# Patient Record
Sex: FEMALE | Race: WHITE | NOT HISPANIC OR LATINO | URBAN - METROPOLITAN AREA
[De-identification: names, ages, dates, MRNs, and addresses within clinical notes are randomized per-mention and may not be internally consistent; named-entity substitution may affect disease eponyms.]

---

## 2018-05-31 ENCOUNTER — EMERGENCY (EMERGENCY)
Facility: HOSPITAL | Age: 63
LOS: 1 days | Discharge: HOME | End: 2018-05-31
Attending: EMERGENCY MEDICINE
Payer: COMMERCIAL

## 2018-05-31 VITALS
RESPIRATION RATE: 16 BRPM | TEMPERATURE: 98 F | WEIGHT: 169.98 LBS | HEART RATE: 74 BPM | SYSTOLIC BLOOD PRESSURE: 119 MMHG | OXYGEN SATURATION: 100 % | DIASTOLIC BLOOD PRESSURE: 75 MMHG

## 2018-05-31 VITALS
OXYGEN SATURATION: 100 % | HEART RATE: 65 BPM | SYSTOLIC BLOOD PRESSURE: 107 MMHG | RESPIRATION RATE: 16 BRPM | TEMPERATURE: 98 F

## 2018-05-31 DIAGNOSIS — Z90.5 ACQUIRED ABSENCE OF KIDNEY: Chronic | ICD-10-CM

## 2018-05-31 LAB
ALBUMIN SERPL ELPH-MCNC: 3.8 G/DL — SIGNIFICANT CHANGE UP (ref 3.3–5)
ALP SERPL-CCNC: 89 U/L — SIGNIFICANT CHANGE UP (ref 40–120)
ALT FLD-CCNC: 35 U/L — SIGNIFICANT CHANGE UP (ref 12–78)
ANION GAP SERPL CALC-SCNC: 8 MMOL/L — SIGNIFICANT CHANGE UP (ref 5–17)
AST SERPL-CCNC: 22 U/L — SIGNIFICANT CHANGE UP (ref 15–37)
BASOPHILS # BLD AUTO: 0.03 K/UL — SIGNIFICANT CHANGE UP (ref 0–0.2)
BASOPHILS NFR BLD AUTO: 0.5 % — SIGNIFICANT CHANGE UP (ref 0–2)
BILIRUB SERPL-MCNC: 0.5 MG/DL — SIGNIFICANT CHANGE UP (ref 0.2–1.2)
BUN SERPL-MCNC: 22 MG/DL — SIGNIFICANT CHANGE UP (ref 7–23)
CALCIUM SERPL-MCNC: 8.9 MG/DL — SIGNIFICANT CHANGE UP (ref 8.5–10.1)
CHLORIDE SERPL-SCNC: 107 MMOL/L — SIGNIFICANT CHANGE UP (ref 96–108)
CO2 SERPL-SCNC: 25 MMOL/L — SIGNIFICANT CHANGE UP (ref 22–31)
CREAT SERPL-MCNC: 1.1 MG/DL — SIGNIFICANT CHANGE UP (ref 0.5–1.3)
EOSINOPHIL # BLD AUTO: 0.1 K/UL — SIGNIFICANT CHANGE UP (ref 0–0.5)
EOSINOPHIL NFR BLD AUTO: 1.8 % — SIGNIFICANT CHANGE UP (ref 0–6)
GLUCOSE SERPL-MCNC: 104 MG/DL — HIGH (ref 70–99)
HCT VFR BLD CALC: 37.6 % — SIGNIFICANT CHANGE UP (ref 34.5–45)
HGB BLD-MCNC: 12.9 G/DL — SIGNIFICANT CHANGE UP (ref 11.5–15.5)
IMM GRANULOCYTES NFR BLD AUTO: 0.2 % — SIGNIFICANT CHANGE UP (ref 0–1.5)
LYMPHOCYTES # BLD AUTO: 2.6 K/UL — SIGNIFICANT CHANGE UP (ref 1–3.3)
LYMPHOCYTES # BLD AUTO: 46.5 % — HIGH (ref 13–44)
MCHC RBC-ENTMCNC: 31.2 PG — SIGNIFICANT CHANGE UP (ref 27–34)
MCHC RBC-ENTMCNC: 34.3 GM/DL — SIGNIFICANT CHANGE UP (ref 32–36)
MCV RBC AUTO: 90.8 FL — SIGNIFICANT CHANGE UP (ref 80–100)
MONOCYTES # BLD AUTO: 0.47 K/UL — SIGNIFICANT CHANGE UP (ref 0–0.9)
MONOCYTES NFR BLD AUTO: 8.4 % — SIGNIFICANT CHANGE UP (ref 2–14)
NEUTROPHILS # BLD AUTO: 2.38 K/UL — SIGNIFICANT CHANGE UP (ref 1.8–7.4)
NEUTROPHILS NFR BLD AUTO: 42.6 % — LOW (ref 43–77)
NRBC # BLD: 0 /100 WBCS — SIGNIFICANT CHANGE UP (ref 0–0)
PLATELET # BLD AUTO: 115 K/UL — LOW (ref 150–400)
POTASSIUM SERPL-MCNC: 4.1 MMOL/L — SIGNIFICANT CHANGE UP (ref 3.5–5.3)
POTASSIUM SERPL-SCNC: 4.1 MMOL/L — SIGNIFICANT CHANGE UP (ref 3.5–5.3)
PROT SERPL-MCNC: 7.2 G/DL — SIGNIFICANT CHANGE UP (ref 6–8.3)
RBC # BLD: 4.14 M/UL — SIGNIFICANT CHANGE UP (ref 3.8–5.2)
RBC # FLD: 12.8 % — SIGNIFICANT CHANGE UP (ref 10.3–14.5)
SODIUM SERPL-SCNC: 140 MMOL/L — SIGNIFICANT CHANGE UP (ref 135–145)
TROPONIN I SERPL-MCNC: <.015 NG/ML — SIGNIFICANT CHANGE UP (ref 0.01–0.04)
WBC # BLD: 5.59 K/UL — SIGNIFICANT CHANGE UP (ref 3.8–10.5)
WBC # FLD AUTO: 5.59 K/UL — SIGNIFICANT CHANGE UP (ref 3.8–10.5)

## 2018-05-31 PROCEDURE — 85027 COMPLETE CBC AUTOMATED: CPT

## 2018-05-31 PROCEDURE — 96374 THER/PROPH/DIAG INJ IV PUSH: CPT

## 2018-05-31 PROCEDURE — 70450 CT HEAD/BRAIN W/O DYE: CPT

## 2018-05-31 PROCEDURE — 80053 COMPREHEN METABOLIC PANEL: CPT

## 2018-05-31 PROCEDURE — 84484 ASSAY OF TROPONIN QUANT: CPT

## 2018-05-31 PROCEDURE — 99284 EMERGENCY DEPT VISIT MOD MDM: CPT | Mod: 25

## 2018-05-31 PROCEDURE — 96375 TX/PRO/DX INJ NEW DRUG ADDON: CPT

## 2018-05-31 PROCEDURE — 99285 EMERGENCY DEPT VISIT HI MDM: CPT | Mod: 25

## 2018-05-31 PROCEDURE — 70450 CT HEAD/BRAIN W/O DYE: CPT | Mod: 26

## 2018-05-31 PROCEDURE — 93010 ELECTROCARDIOGRAM REPORT: CPT

## 2018-05-31 PROCEDURE — 93005 ELECTROCARDIOGRAM TRACING: CPT

## 2018-05-31 RX ORDER — SODIUM CHLORIDE 9 MG/ML
3 INJECTION INTRAMUSCULAR; INTRAVENOUS; SUBCUTANEOUS ONCE
Qty: 0 | Refills: 0 | Status: COMPLETED | OUTPATIENT
Start: 2018-05-31 | End: 2018-05-31

## 2018-05-31 RX ORDER — METOCLOPRAMIDE HCL 10 MG
10 TABLET ORAL ONCE
Qty: 0 | Refills: 0 | Status: COMPLETED | OUTPATIENT
Start: 2018-05-31 | End: 2018-05-31

## 2018-05-31 RX ADMIN — Medication 125 MILLIGRAM(S): at 04:31

## 2018-05-31 RX ADMIN — Medication 10 MILLIGRAM(S): at 04:32

## 2018-05-31 RX ADMIN — SODIUM CHLORIDE 3 MILLILITER(S): 9 INJECTION INTRAMUSCULAR; INTRAVENOUS; SUBCUTANEOUS at 03:34

## 2018-05-31 NOTE — ED PROVIDER NOTE - OBJECTIVE STATEMENT
64yo female who presenst with palpitations paresthesia and "fogginess' on and off for several weeks. pt states she has not been feeling herself, with paresthesias to body, headaches, feeling foggy, no chest pain, vomiting, pt was seen by her pmd and had blood work done and only showed high cholesterol.

## 2018-05-31 NOTE — ED PROVIDER NOTE - CPE EDP NEURO NORM
..Left message on machine to call.     
..Patient notified of mammogram results,   Diagnostic mammogram is scheduled on 1/12/2018.  
normal...

## 2018-05-31 NOTE — ED ADULT NURSE REASSESSMENT NOTE - NS ED NURSE REASSESS COMMENT FT1
0358 Pt. to and from CT in no distress. Normal neuro. Does not want meds as ordered at this time. Doctor aware. No SI/HI/hallucinations. Significant other at bedside. IVF KVO. Will monitor. Pending results/dispo - kris rn

## 2018-05-31 NOTE — ED ADULT NURSE NOTE - OBJECTIVE STATEMENT
Pt. presents to ED after waking up with fuzzy feeling in head, tingling in all extremities, took anxiety pill with some relief. Normal neuro. No drift, no facial droop. A + O x 4.

## 2018-05-31 NOTE — ED PROVIDER NOTE - PROGRESS NOTE DETAILS
pt reevaluated, feeling better, symptoms seem to have improved with meds, results reviewed with pt pt to be d/c home follow up with neuro and return if any symtpoms wrosen

## 2018-05-31 NOTE — ED PROVIDER NOTE - CARE PLAN
Principal Discharge DX:	Paresthesias Principal Discharge DX:	Paresthesias  Secondary Diagnosis:	Migraine

## 2018-05-31 NOTE — ED ADULT TRIAGE NOTE - CHIEF COMPLAINT QUOTE
Pt with palpitations and tingling to extremities - hasn't been feeling well for 2 weeks feels "wobbly and fuzzy" also c/o abd pain

## 2018-10-29 ENCOUNTER — EMERGENCY (EMERGENCY)
Facility: HOSPITAL | Age: 63
LOS: 1 days | Discharge: HOME | End: 2018-10-29
Attending: EMERGENCY MEDICINE
Payer: COMMERCIAL

## 2018-10-29 VITALS
HEIGHT: 61 IN | HEART RATE: 64 BPM | DIASTOLIC BLOOD PRESSURE: 54 MMHG | RESPIRATION RATE: 14 BRPM | WEIGHT: 164.91 LBS | SYSTOLIC BLOOD PRESSURE: 122 MMHG | TEMPERATURE: 98 F | OXYGEN SATURATION: 100 %

## 2018-10-29 VITALS
OXYGEN SATURATION: 100 % | SYSTOLIC BLOOD PRESSURE: 118 MMHG | TEMPERATURE: 98 F | RESPIRATION RATE: 16 BRPM | DIASTOLIC BLOOD PRESSURE: 60 MMHG | HEART RATE: 70 BPM

## 2018-10-29 DIAGNOSIS — Z90.5 ACQUIRED ABSENCE OF KIDNEY: Chronic | ICD-10-CM

## 2018-10-29 LAB
ALBUMIN SERPL ELPH-MCNC: 3.5 G/DL — SIGNIFICANT CHANGE UP (ref 3.3–5)
ALP SERPL-CCNC: 87 U/L — SIGNIFICANT CHANGE UP (ref 40–120)
ALT FLD-CCNC: 39 U/L — SIGNIFICANT CHANGE UP (ref 12–78)
ANION GAP SERPL CALC-SCNC: 10 MMOL/L — SIGNIFICANT CHANGE UP (ref 5–17)
APTT BLD: 31.8 SEC — SIGNIFICANT CHANGE UP (ref 27.5–37.4)
AST SERPL-CCNC: 25 U/L — SIGNIFICANT CHANGE UP (ref 15–37)
BASOPHILS # BLD AUTO: 0.04 K/UL — SIGNIFICANT CHANGE UP (ref 0–0.2)
BASOPHILS NFR BLD AUTO: 0.7 % — SIGNIFICANT CHANGE UP (ref 0–2)
BILIRUB SERPL-MCNC: 0.4 MG/DL — SIGNIFICANT CHANGE UP (ref 0.2–1.2)
BUN SERPL-MCNC: 17 MG/DL — SIGNIFICANT CHANGE UP (ref 7–23)
CALCIUM SERPL-MCNC: 8.5 MG/DL — SIGNIFICANT CHANGE UP (ref 8.5–10.1)
CHLORIDE SERPL-SCNC: 107 MMOL/L — SIGNIFICANT CHANGE UP (ref 96–108)
CO2 SERPL-SCNC: 22 MMOL/L — SIGNIFICANT CHANGE UP (ref 22–31)
CREAT SERPL-MCNC: 1.4 MG/DL — HIGH (ref 0.5–1.3)
EOSINOPHIL # BLD AUTO: 0.1 K/UL — SIGNIFICANT CHANGE UP (ref 0–0.5)
EOSINOPHIL NFR BLD AUTO: 1.7 % — SIGNIFICANT CHANGE UP (ref 0–6)
GLUCOSE SERPL-MCNC: 87 MG/DL — SIGNIFICANT CHANGE UP (ref 70–99)
HCT VFR BLD CALC: 39.8 % — SIGNIFICANT CHANGE UP (ref 34.5–45)
HGB BLD-MCNC: 13.6 G/DL — SIGNIFICANT CHANGE UP (ref 11.5–15.5)
IMM GRANULOCYTES NFR BLD AUTO: 0.2 % — SIGNIFICANT CHANGE UP (ref 0–1.5)
INR BLD: 0.99 RATIO — SIGNIFICANT CHANGE UP (ref 0.88–1.16)
LACTATE SERPL-SCNC: 0.7 MMOL/L — SIGNIFICANT CHANGE UP (ref 0.7–2)
LYMPHOCYTES # BLD AUTO: 2.11 K/UL — SIGNIFICANT CHANGE UP (ref 1–3.3)
LYMPHOCYTES # BLD AUTO: 35.6 % — SIGNIFICANT CHANGE UP (ref 13–44)
MCHC RBC-ENTMCNC: 31.2 PG — SIGNIFICANT CHANGE UP (ref 27–34)
MCHC RBC-ENTMCNC: 34.2 GM/DL — SIGNIFICANT CHANGE UP (ref 32–36)
MCV RBC AUTO: 91.3 FL — SIGNIFICANT CHANGE UP (ref 80–100)
MONOCYTES # BLD AUTO: 0.63 K/UL — SIGNIFICANT CHANGE UP (ref 0–0.9)
MONOCYTES NFR BLD AUTO: 10.6 % — SIGNIFICANT CHANGE UP (ref 2–14)
NEUTROPHILS # BLD AUTO: 3.04 K/UL — SIGNIFICANT CHANGE UP (ref 1.8–7.4)
NEUTROPHILS NFR BLD AUTO: 51.2 % — SIGNIFICANT CHANGE UP (ref 43–77)
NRBC # BLD: 0 /100 WBCS — SIGNIFICANT CHANGE UP (ref 0–0)
PLATELET # BLD AUTO: 148 K/UL — LOW (ref 150–400)
POTASSIUM SERPL-MCNC: 4.1 MMOL/L — SIGNIFICANT CHANGE UP (ref 3.5–5.3)
POTASSIUM SERPL-SCNC: 4.1 MMOL/L — SIGNIFICANT CHANGE UP (ref 3.5–5.3)
PROT SERPL-MCNC: 7.1 G/DL — SIGNIFICANT CHANGE UP (ref 6–8.3)
PROTHROM AB SERPL-ACNC: 10.8 SEC — SIGNIFICANT CHANGE UP (ref 9.8–12.7)
RBC # BLD: 4.36 M/UL — SIGNIFICANT CHANGE UP (ref 3.8–5.2)
RBC # FLD: 13.1 % — SIGNIFICANT CHANGE UP (ref 10.3–14.5)
SODIUM SERPL-SCNC: 139 MMOL/L — SIGNIFICANT CHANGE UP (ref 135–145)
WBC # BLD: 5.93 K/UL — SIGNIFICANT CHANGE UP (ref 3.8–10.5)
WBC # FLD AUTO: 5.93 K/UL — SIGNIFICANT CHANGE UP (ref 3.8–10.5)

## 2018-10-29 PROCEDURE — 80053 COMPREHEN METABOLIC PANEL: CPT

## 2018-10-29 PROCEDURE — 83605 ASSAY OF LACTIC ACID: CPT

## 2018-10-29 PROCEDURE — 96365 THER/PROPH/DIAG IV INF INIT: CPT | Mod: XU

## 2018-10-29 PROCEDURE — 70481 CT ORBIT/EAR/FOSSA W/DYE: CPT | Mod: 26

## 2018-10-29 PROCEDURE — 36415 COLL VENOUS BLD VENIPUNCTURE: CPT

## 2018-10-29 PROCEDURE — 85027 COMPLETE CBC AUTOMATED: CPT

## 2018-10-29 PROCEDURE — 85610 PROTHROMBIN TIME: CPT

## 2018-10-29 PROCEDURE — 87040 BLOOD CULTURE FOR BACTERIA: CPT

## 2018-10-29 PROCEDURE — 85730 THROMBOPLASTIN TIME PARTIAL: CPT

## 2018-10-29 PROCEDURE — 70481 CT ORBIT/EAR/FOSSA W/DYE: CPT

## 2018-10-29 PROCEDURE — 99284 EMERGENCY DEPT VISIT MOD MDM: CPT

## 2018-10-29 PROCEDURE — 99284 EMERGENCY DEPT VISIT MOD MDM: CPT | Mod: 25

## 2018-10-29 RX ORDER — AMPICILLIN SODIUM AND SULBACTAM SODIUM 250; 125 MG/ML; MG/ML
3 INJECTION, POWDER, FOR SUSPENSION INTRAMUSCULAR; INTRAVENOUS ONCE
Qty: 0 | Refills: 0 | Status: COMPLETED | OUTPATIENT
Start: 2018-10-29 | End: 2018-10-29

## 2018-10-29 RX ORDER — FLUORESCEIN SODIUM 9 MG
1 STRIP OPHTHALMIC (EYE) ONCE
Qty: 0 | Refills: 0 | Status: COMPLETED | OUTPATIENT
Start: 2018-10-29 | End: 2018-10-29

## 2018-10-29 RX ADMIN — AMPICILLIN SODIUM AND SULBACTAM SODIUM 3 GRAM(S): 250; 125 INJECTION, POWDER, FOR SUSPENSION INTRAMUSCULAR; INTRAVENOUS at 20:05

## 2018-10-29 RX ADMIN — Medication 1 APPLICATION(S): at 19:15

## 2018-10-29 RX ADMIN — AMPICILLIN SODIUM AND SULBACTAM SODIUM 200 GRAM(S): 250; 125 INJECTION, POWDER, FOR SUSPENSION INTRAMUSCULAR; INTRAVENOUS at 19:32

## 2018-10-29 RX ADMIN — Medication 1 DROP(S): at 19:16

## 2018-10-29 NOTE — ED PROVIDER NOTE - OBJECTIVE STATEMENT
pt c/o rash to right side face x 4 days. no fevers, chills, ha, d/n/v, eye pain or visual changes, weakness or numbness. pt relates itching to site, minimal pain, began as pimple , now erythema spread. pt on bactrim x 3 days without improvement  pmsameer - katlyn

## 2018-10-29 NOTE — ED ADULT NURSE NOTE - OBJECTIVE STATEMENT
received pt in bed #6b Pt A&O c/o right eye redness, swelling & itching since friday States was seen @ Dr potts & given bactrim "but it is not getting better" Pt seen by Dr Mckinney

## 2018-10-29 NOTE — ED PROVIDER NOTE - SKIN, MLM
Skin normal color for race, warm, dry. erythema and mild tenderness to right side nose and under right eye. no vesicles Skin normal color for race, warm, dry. erythema and mild tenderness to right side medial aspect nose and under right eye. no vesicles. no lesions on ear or tip of nose

## 2018-11-03 LAB
CULTURE RESULTS: SIGNIFICANT CHANGE UP
CULTURE RESULTS: SIGNIFICANT CHANGE UP
SPECIMEN SOURCE: SIGNIFICANT CHANGE UP
SPECIMEN SOURCE: SIGNIFICANT CHANGE UP

## 2020-08-14 ENCOUNTER — RESULT REVIEW (OUTPATIENT)
Age: 65
End: 2020-08-14

## 2020-10-13 ENCOUNTER — OUTPATIENT (OUTPATIENT)
Dept: OUTPATIENT SERVICES | Facility: HOSPITAL | Age: 65
LOS: 1 days | End: 2020-10-13

## 2020-10-13 ENCOUNTER — RESULT REVIEW (OUTPATIENT)
Age: 65
End: 2020-10-13

## 2020-10-13 DIAGNOSIS — Z90.5 ACQUIRED ABSENCE OF KIDNEY: Chronic | ICD-10-CM

## 2020-10-13 DIAGNOSIS — C50.919 MALIGNANT NEOPLASM OF UNSPECIFIED SITE OF UNSPECIFIED FEMALE BREAST: ICD-10-CM

## 2020-10-14 LAB — SURGICAL PATHOLOGY STUDY: SIGNIFICANT CHANGE UP

## 2020-11-03 ENCOUNTER — APPOINTMENT (OUTPATIENT)
Dept: ULTRASOUND IMAGING | Facility: IMAGING CENTER | Age: 65
End: 2020-11-03
Payer: COMMERCIAL

## 2020-11-03 ENCOUNTER — OUTPATIENT (OUTPATIENT)
Dept: OUTPATIENT SERVICES | Facility: HOSPITAL | Age: 65
LOS: 1 days | End: 2020-11-03
Payer: COMMERCIAL

## 2020-11-03 VITALS
DIASTOLIC BLOOD PRESSURE: 70 MMHG | WEIGHT: 149.91 LBS | HEIGHT: 61 IN | RESPIRATION RATE: 16 BRPM | TEMPERATURE: 98 F | HEART RATE: 72 BPM | OXYGEN SATURATION: 99 % | SYSTOLIC BLOOD PRESSURE: 102 MMHG

## 2020-11-03 DIAGNOSIS — Z90.5 ACQUIRED ABSENCE OF KIDNEY: Chronic | ICD-10-CM

## 2020-11-03 DIAGNOSIS — C50.411 MALIGNANT NEOPLASM OF UPPER-OUTER QUADRANT OF RIGHT FEMALE BREAST: ICD-10-CM

## 2020-11-03 DIAGNOSIS — Z90.89 ACQUIRED ABSENCE OF OTHER ORGANS: Chronic | ICD-10-CM

## 2020-11-03 DIAGNOSIS — Z98.891 HISTORY OF UTERINE SCAR FROM PREVIOUS SURGERY: Chronic | ICD-10-CM

## 2020-11-03 DIAGNOSIS — C50.919 MALIGNANT NEOPLASM OF UNSPECIFIED SITE OF UNSPECIFIED FEMALE BREAST: ICD-10-CM

## 2020-11-03 DIAGNOSIS — Z01.818 ENCOUNTER FOR OTHER PREPROCEDURAL EXAMINATION: ICD-10-CM

## 2020-11-03 DIAGNOSIS — Z17.0 ESTROGEN RECEPTOR POSITIVE STATUS [ER+]: ICD-10-CM

## 2020-11-03 DIAGNOSIS — Z00.8 ENCOUNTER FOR OTHER GENERAL EXAMINATION: ICD-10-CM

## 2020-11-03 LAB
ANION GAP SERPL CALC-SCNC: 10 MMOL/L — SIGNIFICANT CHANGE UP (ref 5–17)
BUN SERPL-MCNC: 17 MG/DL — SIGNIFICANT CHANGE UP (ref 7–23)
CALCIUM SERPL-MCNC: 10 MG/DL — SIGNIFICANT CHANGE UP (ref 8.4–10.5)
CHLORIDE SERPL-SCNC: 103 MMOL/L — SIGNIFICANT CHANGE UP (ref 96–108)
CO2 SERPL-SCNC: 25 MMOL/L — SIGNIFICANT CHANGE UP (ref 22–31)
CREAT SERPL-MCNC: 1.13 MG/DL — SIGNIFICANT CHANGE UP (ref 0.5–1.3)
GLUCOSE SERPL-MCNC: 119 MG/DL — HIGH (ref 70–99)
HCT VFR BLD CALC: 42.7 % — SIGNIFICANT CHANGE UP (ref 34.5–45)
HGB BLD-MCNC: 14 G/DL — SIGNIFICANT CHANGE UP (ref 11.5–15.5)
MCHC RBC-ENTMCNC: 30.9 PG — SIGNIFICANT CHANGE UP (ref 27–34)
MCHC RBC-ENTMCNC: 32.8 GM/DL — SIGNIFICANT CHANGE UP (ref 32–36)
MCV RBC AUTO: 94.3 FL — SIGNIFICANT CHANGE UP (ref 80–100)
NRBC # BLD: 0 /100 WBCS — SIGNIFICANT CHANGE UP (ref 0–0)
PLATELET # BLD AUTO: 155 K/UL — SIGNIFICANT CHANGE UP (ref 150–400)
POTASSIUM SERPL-MCNC: 4.3 MMOL/L — SIGNIFICANT CHANGE UP (ref 3.5–5.3)
POTASSIUM SERPL-SCNC: 4.3 MMOL/L — SIGNIFICANT CHANGE UP (ref 3.5–5.3)
RBC # BLD: 4.53 M/UL — SIGNIFICANT CHANGE UP (ref 3.8–5.2)
RBC # FLD: 13.2 % — SIGNIFICANT CHANGE UP (ref 10.3–14.5)
SODIUM SERPL-SCNC: 138 MMOL/L — SIGNIFICANT CHANGE UP (ref 135–145)
WBC # BLD: 4.49 K/UL — SIGNIFICANT CHANGE UP (ref 3.8–10.5)
WBC # FLD AUTO: 4.49 K/UL — SIGNIFICANT CHANGE UP (ref 3.8–10.5)

## 2020-11-03 PROCEDURE — 19285 PERQ DEV BREAST 1ST US IMAG: CPT

## 2020-11-03 PROCEDURE — G0463: CPT

## 2020-11-03 PROCEDURE — 85027 COMPLETE CBC AUTOMATED: CPT

## 2020-11-03 PROCEDURE — 19285 PERQ DEV BREAST 1ST US IMAG: CPT | Mod: RT

## 2020-11-03 PROCEDURE — U0003: CPT

## 2020-11-03 PROCEDURE — C1739: CPT

## 2020-11-03 PROCEDURE — 80048 BASIC METABOLIC PNL TOTAL CA: CPT

## 2020-11-03 RX ORDER — LANSOPRAZOLE 15 MG/1
0 CAPSULE, DELAYED RELEASE ORAL
Qty: 0 | Refills: 0 | DISCHARGE

## 2020-11-03 RX ORDER — SODIUM CHLORIDE 9 MG/ML
3 INJECTION INTRAMUSCULAR; INTRAVENOUS; SUBCUTANEOUS EVERY 8 HOURS
Refills: 0 | Status: DISCONTINUED | OUTPATIENT
Start: 2020-11-06 | End: 2020-11-20

## 2020-11-03 RX ORDER — CHLORHEXIDINE GLUCONATE 213 G/1000ML
1 SOLUTION TOPICAL ONCE
Refills: 0 | Status: DISCONTINUED | OUTPATIENT
Start: 2020-11-06 | End: 2020-11-20

## 2020-11-03 NOTE — H&P PST ADULT - NSANTHOSAYNRD_GEN_A_CORE
No. MELIDA screening performed.  STOP BANG Legend: 0-2 = LOW Risk; 3-4 = INTERMEDIATE Risk; 5-8 = HIGH Risk

## 2020-11-03 NOTE — H&P PST ADULT - HISTORY OF PRESENT ILLNESS
65 year old female with PMH of HLD with right breast cancer planned for right breast wide resection post lorie  reflector placement, right sentinel lymph node biopsy, right breast oncoplastic reconstruction on 11/6/2020. 65 year old female with PMH of HLD with right breast cancer planned for right breast wide resection post lorie  reflector placement, right sentinel lymph node biopsy, right breast oncoplastic reconstruction on 11/6/2020 with Dr. Bruner and Dr. [facility] [employer].

## 2020-11-03 NOTE — H&P PST ADULT - PAIN CHRONIC, PROFILE
no Localized Dermabrasion With Wire Brush Text: The patient was draped in routine manner.  Localized dermabrasion using 3 x 17 mm wire brush was performed in routine manner to papillary dermis. This spot dermabrasion is being performed to complete skin cancer reconstruction. It also will eliminate the other sun damaged precancerous cells that are known to be part of the regional effect of a lifetime's worth of sun exposure. This localized dermabrasion is therapeutic and should not be considered cosmetic in any regard. Localized Dermabrasion Text: The patient was draped in routine manner.  Localized dermabrasion using 3 x 17 mm wire brush was performed in routine manner to papillary dermis. This spot dermabrasion is being performed to complete skin cancer reconstruction. It also will eliminate the other sun damaged precancerous cells that are known to be part of the regional effect of a lifetime's worth of sun exposure. This localized dermabrasion is therapeutic and should not be considered cosmetic in any regard.

## 2020-11-03 NOTE — H&P PST ADULT - NSICDXPROBLEM_GEN_ALL_CORE_FT
PROBLEM DIAGNOSES  Problem: Breast cancer, female  Assessment and Plan: planned for right breast wide resection post lorie  reflector placement, right sentinel lymph node biopsy, right breast oncoplastic reconstruction on 11/6/2020 with Dr. Bruner and Dr. [facility] [employer].   PST labs send  preprocedure surgical scrub instructions discussed   will obtain recent EKG/stress test report and cardiac note

## 2020-11-03 NOTE — H&P PST ADULT - NSICDXPASTMEDICALHX_GEN_ALL_CORE_FT
PAST MEDICAL HISTORY:  Breast cancer, female right    Hypercholesteremia     Panic attacks no recent events

## 2020-11-04 LAB — SARS-COV-2 RNA SPEC QL NAA+PROBE: SIGNIFICANT CHANGE UP

## 2020-11-05 ENCOUNTER — TRANSCRIPTION ENCOUNTER (OUTPATIENT)
Age: 65
End: 2020-11-05

## 2020-11-06 ENCOUNTER — OUTPATIENT (OUTPATIENT)
Dept: OUTPATIENT SERVICES | Facility: HOSPITAL | Age: 65
LOS: 1 days | End: 2020-11-06
Payer: COMMERCIAL

## 2020-11-06 ENCOUNTER — RESULT REVIEW (OUTPATIENT)
Age: 65
End: 2020-11-06

## 2020-11-06 ENCOUNTER — APPOINTMENT (OUTPATIENT)
Dept: NUCLEAR MEDICINE | Facility: HOSPITAL | Age: 65
End: 2020-11-06

## 2020-11-06 VITALS
SYSTOLIC BLOOD PRESSURE: 115 MMHG | HEART RATE: 77 BPM | RESPIRATION RATE: 12 BRPM | DIASTOLIC BLOOD PRESSURE: 59 MMHG | OXYGEN SATURATION: 99 %

## 2020-11-06 VITALS
OXYGEN SATURATION: 100 % | DIASTOLIC BLOOD PRESSURE: 69 MMHG | HEIGHT: 61 IN | TEMPERATURE: 97 F | WEIGHT: 149.91 LBS | RESPIRATION RATE: 20 BRPM | HEART RATE: 63 BPM | SYSTOLIC BLOOD PRESSURE: 104 MMHG

## 2020-11-06 DIAGNOSIS — Z90.89 ACQUIRED ABSENCE OF OTHER ORGANS: Chronic | ICD-10-CM

## 2020-11-06 DIAGNOSIS — C50.411 MALIGNANT NEOPLASM OF UPPER-OUTER QUADRANT OF RIGHT FEMALE BREAST: ICD-10-CM

## 2020-11-06 DIAGNOSIS — Z98.891 HISTORY OF UTERINE SCAR FROM PREVIOUS SURGERY: Chronic | ICD-10-CM

## 2020-11-06 DIAGNOSIS — Z90.5 ACQUIRED ABSENCE OF KIDNEY: Chronic | ICD-10-CM

## 2020-11-06 DIAGNOSIS — Z17.0 ESTROGEN RECEPTOR POSITIVE STATUS [ER+]: ICD-10-CM

## 2020-11-06 DIAGNOSIS — Z01.818 ENCOUNTER FOR OTHER PREPROCEDURAL EXAMINATION: ICD-10-CM

## 2020-11-06 PROCEDURE — 88305 TISSUE EXAM BY PATHOLOGIST: CPT

## 2020-11-06 PROCEDURE — 88309 TISSUE EXAM BY PATHOLOGIST: CPT

## 2020-11-06 PROCEDURE — 88360 TUMOR IMMUNOHISTOCHEM/MANUAL: CPT | Mod: 26

## 2020-11-06 PROCEDURE — 88307 TISSUE EXAM BY PATHOLOGIST: CPT | Mod: 26

## 2020-11-06 PROCEDURE — 19316 MASTOPEXY: CPT | Mod: RT

## 2020-11-06 PROCEDURE — 88360 TUMOR IMMUNOHISTOCHEM/MANUAL: CPT

## 2020-11-06 PROCEDURE — 38525 BIOPSY/REMOVAL LYMPH NODES: CPT | Mod: RT

## 2020-11-06 PROCEDURE — 88307 TISSUE EXAM BY PATHOLOGIST: CPT

## 2020-11-06 PROCEDURE — 13121 CMPLX RPR S/A/L 2.6-7.5 CM: CPT

## 2020-11-06 PROCEDURE — 13122 CMPLX RPR S/A/L ADDL 5 CM/>: CPT

## 2020-11-06 PROCEDURE — 19301 PARTIAL MASTECTOMY: CPT | Mod: RT

## 2020-11-06 PROCEDURE — A9541: CPT

## 2020-11-06 PROCEDURE — 88305 TISSUE EXAM BY PATHOLOGIST: CPT | Mod: 26

## 2020-11-06 PROCEDURE — 88331 PATH CONSLTJ SURG 1 BLK 1SPC: CPT | Mod: 26

## 2020-11-06 PROCEDURE — C1889: CPT

## 2020-11-06 PROCEDURE — 76098 X-RAY EXAM SURGICAL SPECIMEN: CPT

## 2020-11-06 PROCEDURE — 76098 X-RAY EXAM SURGICAL SPECIMEN: CPT | Mod: 26

## 2020-11-06 PROCEDURE — 88331 PATH CONSLTJ SURG 1 BLK 1SPC: CPT

## 2020-11-06 PROCEDURE — 88309 TISSUE EXAM BY PATHOLOGIST: CPT | Mod: 26

## 2020-11-06 RX ORDER — HYDROMORPHONE HYDROCHLORIDE 2 MG/ML
0.25 INJECTION INTRAMUSCULAR; INTRAVENOUS; SUBCUTANEOUS
Refills: 0 | Status: DISCONTINUED | OUTPATIENT
Start: 2020-11-06 | End: 2020-11-06

## 2020-11-06 RX ORDER — CEFAZOLIN SODIUM 1 G
2000 VIAL (EA) INJECTION ONCE
Refills: 0 | Status: COMPLETED | OUTPATIENT
Start: 2020-11-06 | End: 2020-11-06

## 2020-11-06 RX ORDER — ONDANSETRON 8 MG/1
4 TABLET, FILM COATED ORAL ONCE
Refills: 0 | Status: DISCONTINUED | OUTPATIENT
Start: 2020-11-06 | End: 2020-11-20

## 2020-11-06 RX ORDER — APREPITANT 80 MG/1
40 CAPSULE ORAL ONCE
Refills: 0 | Status: COMPLETED | OUTPATIENT
Start: 2020-11-06 | End: 2020-11-06

## 2020-11-06 RX ORDER — OXYCODONE HYDROCHLORIDE 5 MG/1
5 TABLET ORAL ONCE
Refills: 0 | Status: DISCONTINUED | OUTPATIENT
Start: 2020-11-06 | End: 2020-11-06

## 2020-11-06 RX ORDER — ACETAMINOPHEN 500 MG
1000 TABLET ORAL ONCE
Refills: 0 | Status: DISCONTINUED | OUTPATIENT
Start: 2020-11-06 | End: 2020-11-06

## 2020-11-06 RX ORDER — SODIUM CHLORIDE 9 MG/ML
1000 INJECTION, SOLUTION INTRAVENOUS
Refills: 0 | Status: DISCONTINUED | OUTPATIENT
Start: 2020-11-06 | End: 2020-11-20

## 2020-11-06 RX ADMIN — APREPITANT 40 MILLIGRAM(S): 80 CAPSULE ORAL at 11:25

## 2020-11-06 RX ADMIN — OXYCODONE HYDROCHLORIDE 5 MILLIGRAM(S): 5 TABLET ORAL at 16:52

## 2020-11-06 NOTE — ASU DISCHARGE PLAN (ADULT/PEDIATRIC) - ASU DC SPECIAL INSTRUCTIONSFT
BRA: Please keep on at all times. This will be readdressed at followup.    PAIN: You may continue to take Acetaminophen (Tylenol) and Ibuprofen (Advil, Motrin) over the counter for pain as needed. You can alternate the two medications, giving one every 3 hours. You were also prescribed a pain medication that you can take, only take as prescribed. Do not put ice on the incisions, this can cause the skin to burn.    WOUND CARE:  Keep all dressings on. Do not remove any dressings. This will be readdressed at followup. Do not get any of the dressings wet. You can shower with the water hitting your back, but do not face the shower head. Keep the bra on even in the shower.    BATHING: Please do not soak or submerge the wound in water (bath, swimming) for 14 days after your surgery.    ACTIVITY: No heavy lifting, straining, or vigorous activity until your follow-up appointment in 2 weeks.     NOTIFY US IF: You have bleeding that does not stop, any pus draining from your wound(s), any fever (over 100.5 F) or chills, persistent nausea/vomiting, persistent diarrhea, or if your pain is not controlled on your discharge pain medications.    FOLLOW-UP: Please call the office and make an appointment to follow up with Dr. Méndez in 1 week.  Please followup with Dr. Bruner in 1-2 weeks, call her office to make an appointment. Please follow up with your primary care physician in 1-2 weeks regarding your hospitalization.

## 2020-11-06 NOTE — ASU DISCHARGE PLAN (ADULT/PEDIATRIC) - FOLLOW UP APPOINTMENTS
[facility] ([facility]): 911 or go to the nearest Emergency Room

## 2020-11-06 NOTE — ASU DISCHARGE PLAN (ADULT/PEDIATRIC) - PROVIDER TOKENS
PROVIDER:[TOKEN:[[id number]],FOLLOWUP:[1 week],ESTABLISHEDPATIENT:[T]],PROVIDER:[TOKEN:[[id number]],FOLLOWUP:[2 weeks],ESTABLISHEDPATIENT:[T]]

## 2020-11-06 NOTE — PRE-ANESTHESIA EVALUATION ADULT - NSPROPOSEDPROCEDFT_GEN_ALL_CORE
Right breast wide resection post lorie  reflector placement/right sentinel lymph node biopsy; right breast oncoplastic reconstruction

## 2020-11-06 NOTE — ASU DISCHARGE PLAN (ADULT/PEDIATRIC) - PROCEDURE
Right breast wide excision post lorie , right sentinal lymph node biopsy, right breast reconstruction

## 2020-11-06 NOTE — ASU DISCHARGE PLAN (ADULT/PEDIATRIC) - CALL YOUR DOCTOR IF YOU HAVE ANY OF THE FOLLOWING:
Wound/Surgical Site with redness, or foul smelling discharge or pus/Numbness, tingling, color or temperature change to extremity/Fever greater than (need to indicate Fahrenheit or Celsius)/Bleeding that does not stop/Pain not relieved by Medications/Nausea and vomiting that does not stop/Swelling that gets worse

## 2020-11-06 NOTE — ASU DISCHARGE PLAN (ADULT/PEDIATRIC) - CARE PROVIDER_API CALL
Mani Méndez  PLASTIC SURGERY  [address] [place], [place] [id number]  Phone: [phone]  Fax: [phone]  Established Patient  Follow Up Time: 1 week    Niki Bruner  SURGERY  [facility], [address]  [place] [address]  Phone: ()-  Fax: ()-  Established Patient  Follow Up Time: 2 weeks

## 2020-11-06 NOTE — ASU DISCHARGE PLAN (ADULT/PEDIATRIC) - NURSING INSTRUCTIONS
Last dose of TYLENOL for pain management was at 2:45 PM. Next dose of TYLENOL may be taken at or after 8:45 PM if needed. DO NOT take any additional products containing TYLENOL or ACETAMINOPHEN, such as VICODIN, PERCOCET, NORCO, EXCEDRIN, and any over-the-counter cold medications. DO NOT CONSUME MORE THAN 3649-3729 MG OF TYLENOL (acetaminophen) in a 24-hour period.

## 2020-11-11 LAB — SURGICAL PATHOLOGY STUDY: SIGNIFICANT CHANGE UP

## 2020-12-30 PROBLEM — F41.0 PANIC DISORDER [EPISODIC PAROXYSMAL ANXIETY]: Chronic | Status: ACTIVE | Noted: 2018-05-31

## 2021-01-13 ENCOUNTER — OUTPATIENT (OUTPATIENT)
Dept: OUTPATIENT SERVICES | Facility: HOSPITAL | Age: 66
LOS: 1 days | End: 2021-01-13
Payer: COMMERCIAL

## 2021-01-13 ENCOUNTER — APPOINTMENT (OUTPATIENT)
Dept: MAMMOGRAPHY | Facility: IMAGING CENTER | Age: 66
End: 2021-01-13
Payer: COMMERCIAL

## 2021-01-13 DIAGNOSIS — Z98.891 HISTORY OF UTERINE SCAR FROM PREVIOUS SURGERY: Chronic | ICD-10-CM

## 2021-01-13 DIAGNOSIS — Z90.89 ACQUIRED ABSENCE OF OTHER ORGANS: Chronic | ICD-10-CM

## 2021-01-13 DIAGNOSIS — Z90.5 ACQUIRED ABSENCE OF KIDNEY: Chronic | ICD-10-CM

## 2021-01-13 DIAGNOSIS — Z00.8 ENCOUNTER FOR OTHER GENERAL EXAMINATION: ICD-10-CM

## 2021-01-13 PROCEDURE — 19281 PERQ DEVICE BREAST 1ST IMAG: CPT

## 2021-01-13 PROCEDURE — 19281 PERQ DEVICE BREAST 1ST IMAG: CPT | Mod: RT

## 2021-01-13 PROCEDURE — C1739: CPT

## 2021-01-15 ENCOUNTER — OUTPATIENT (OUTPATIENT)
Dept: OUTPATIENT SERVICES | Facility: HOSPITAL | Age: 66
LOS: 1 days | End: 2021-01-15
Payer: COMMERCIAL

## 2021-01-15 VITALS
OXYGEN SATURATION: 100 % | WEIGHT: 153 LBS | DIASTOLIC BLOOD PRESSURE: 72 MMHG | HEART RATE: 71 BPM | TEMPERATURE: 98 F | RESPIRATION RATE: 18 BRPM | SYSTOLIC BLOOD PRESSURE: 103 MMHG | HEIGHT: 61 IN

## 2021-01-15 DIAGNOSIS — Z90.89 ACQUIRED ABSENCE OF OTHER ORGANS: Chronic | ICD-10-CM

## 2021-01-15 DIAGNOSIS — Z98.891 HISTORY OF UTERINE SCAR FROM PREVIOUS SURGERY: Chronic | ICD-10-CM

## 2021-01-15 DIAGNOSIS — C50.919 MALIGNANT NEOPLASM OF UNSPECIFIED SITE OF UNSPECIFIED FEMALE BREAST: ICD-10-CM

## 2021-01-15 DIAGNOSIS — C50.411 MALIGNANT NEOPLASM OF UPPER-OUTER QUADRANT OF RIGHT FEMALE BREAST: ICD-10-CM

## 2021-01-15 DIAGNOSIS — Z90.5 ACQUIRED ABSENCE OF KIDNEY: Chronic | ICD-10-CM

## 2021-01-15 DIAGNOSIS — Z17.0 ESTROGEN RECEPTOR POSITIVE STATUS [ER+]: ICD-10-CM

## 2021-01-15 DIAGNOSIS — Z98.890 OTHER SPECIFIED POSTPROCEDURAL STATES: Chronic | ICD-10-CM

## 2021-01-15 DIAGNOSIS — Z01.818 ENCOUNTER FOR OTHER PREPROCEDURAL EXAMINATION: ICD-10-CM

## 2021-01-15 PROCEDURE — 85027 COMPLETE CBC AUTOMATED: CPT

## 2021-01-15 PROCEDURE — 80048 BASIC METABOLIC PNL TOTAL CA: CPT

## 2021-01-15 PROCEDURE — G0463: CPT

## 2021-01-15 RX ORDER — CEFAZOLIN SODIUM 1 G
2000 VIAL (EA) INJECTION ONCE
Refills: 0 | Status: DISCONTINUED | OUTPATIENT
Start: 2021-01-21 | End: 2021-02-05

## 2021-01-15 RX ORDER — CHLORHEXIDINE GLUCONATE 213 G/1000ML
1 SOLUTION TOPICAL ONCE
Refills: 0 | Status: DISCONTINUED | OUTPATIENT
Start: 2021-01-21 | End: 2021-02-05

## 2021-01-15 RX ORDER — APREPITANT 80 MG/1
40 CAPSULE ORAL ONCE
Refills: 0 | Status: DISCONTINUED | OUTPATIENT
Start: 2021-01-21 | End: 2021-02-05

## 2021-01-15 RX ORDER — SODIUM CHLORIDE 9 MG/ML
3 INJECTION INTRAMUSCULAR; INTRAVENOUS; SUBCUTANEOUS EVERY 8 HOURS
Refills: 0 | Status: DISCONTINUED | OUTPATIENT
Start: 2021-01-21 | End: 2021-02-05

## 2021-01-15 NOTE — H&P PST ADULT - HISTORY OF PRESENT ILLNESS
65yr old female with hx of malignant neoplasm upper outer quadrant  of breast had lumpectomy on 11/2020. Follow up studies found more  cancerous cell in breast. Pt now returns for right breast wide excision  post lorie  reflector placement. Right breast oncoplastic reconstruction    Note; covid test 1/19/21

## 2021-01-15 NOTE — H&P PST ADULT - NSICDXPASTMEDICALHX_GEN_ALL_CORE_FT
PAST MEDICAL HISTORY:  Breast cancer, female right 8/2020    Hypercholesteremia     Panic attacks no recent events

## 2021-01-15 NOTE — H&P PST ADULT - NSICDXPROBLEM_GEN_ALL_CORE_FT
PROBLEM DIAGNOSES  Problem: Malignant neoplasm of breast  Assessment and Plan: coming in for right wide resection post lorie  reflector placement  right breast onocoplastic reconstruction

## 2021-01-15 NOTE — H&P PST ADULT - NSICDXPASTSURGICALHX_GEN_ALL_CORE_FT
PAST SURGICAL HISTORY:  History of lumpectomy of right breast 2020    S/P  x2 1993    S/p nephrectomy donor, left    S/P tonsillectomy childhood

## 2021-01-19 ENCOUNTER — OUTPATIENT (OUTPATIENT)
Dept: OUTPATIENT SERVICES | Facility: HOSPITAL | Age: 66
LOS: 1 days | End: 2021-01-19
Payer: COMMERCIAL

## 2021-01-19 DIAGNOSIS — Z98.890 OTHER SPECIFIED POSTPROCEDURAL STATES: Chronic | ICD-10-CM

## 2021-01-19 DIAGNOSIS — Z98.891 HISTORY OF UTERINE SCAR FROM PREVIOUS SURGERY: Chronic | ICD-10-CM

## 2021-01-19 DIAGNOSIS — Z90.5 ACQUIRED ABSENCE OF KIDNEY: Chronic | ICD-10-CM

## 2021-01-19 DIAGNOSIS — Z90.89 ACQUIRED ABSENCE OF OTHER ORGANS: Chronic | ICD-10-CM

## 2021-01-19 DIAGNOSIS — Z20.828 CONTACT WITH AND (SUSPECTED) EXPOSURE TO OTHER VIRAL COMMUNICABLE DISEASES: ICD-10-CM

## 2021-01-19 LAB — SARS-COV-2 RNA SPEC QL NAA+PROBE: SIGNIFICANT CHANGE UP

## 2021-01-19 PROCEDURE — U0005: CPT

## 2021-01-19 PROCEDURE — U0003: CPT

## 2021-01-19 PROCEDURE — C9803: CPT

## 2021-01-20 ENCOUNTER — TRANSCRIPTION ENCOUNTER (OUTPATIENT)
Age: 66
End: 2021-01-20

## 2021-01-21 ENCOUNTER — OUTPATIENT (OUTPATIENT)
Dept: OUTPATIENT SERVICES | Facility: HOSPITAL | Age: 66
LOS: 1 days | End: 2021-01-21
Payer: COMMERCIAL

## 2021-01-21 ENCOUNTER — RESULT REVIEW (OUTPATIENT)
Age: 66
End: 2021-01-21

## 2021-01-21 VITALS
DIASTOLIC BLOOD PRESSURE: 70 MMHG | HEART RATE: 71 BPM | OXYGEN SATURATION: 99 % | HEIGHT: 61 IN | RESPIRATION RATE: 16 BRPM | TEMPERATURE: 98 F | WEIGHT: 153 LBS | SYSTOLIC BLOOD PRESSURE: 112 MMHG

## 2021-01-21 VITALS
OXYGEN SATURATION: 100 % | DIASTOLIC BLOOD PRESSURE: 70 MMHG | RESPIRATION RATE: 16 BRPM | SYSTOLIC BLOOD PRESSURE: 111 MMHG | HEART RATE: 73 BPM

## 2021-01-21 DIAGNOSIS — Z98.890 OTHER SPECIFIED POSTPROCEDURAL STATES: Chronic | ICD-10-CM

## 2021-01-21 DIAGNOSIS — Z90.5 ACQUIRED ABSENCE OF KIDNEY: Chronic | ICD-10-CM

## 2021-01-21 DIAGNOSIS — Z17.0 ESTROGEN RECEPTOR POSITIVE STATUS [ER+]: ICD-10-CM

## 2021-01-21 DIAGNOSIS — C50.411 MALIGNANT NEOPLASM OF UPPER-OUTER QUADRANT OF RIGHT FEMALE BREAST: ICD-10-CM

## 2021-01-21 DIAGNOSIS — Z90.89 ACQUIRED ABSENCE OF OTHER ORGANS: Chronic | ICD-10-CM

## 2021-01-21 DIAGNOSIS — Z98.891 HISTORY OF UTERINE SCAR FROM PREVIOUS SURGERY: Chronic | ICD-10-CM

## 2021-01-21 PROCEDURE — 88307 TISSUE EXAM BY PATHOLOGIST: CPT | Mod: 26

## 2021-01-21 PROCEDURE — 76098 X-RAY EXAM SURGICAL SPECIMEN: CPT

## 2021-01-21 PROCEDURE — C1889: CPT

## 2021-01-21 PROCEDURE — 88307 TISSUE EXAM BY PATHOLOGIST: CPT

## 2021-01-21 PROCEDURE — 88304 TISSUE EXAM BY PATHOLOGIST: CPT

## 2021-01-21 PROCEDURE — 19316 MASTOPEXY: CPT | Mod: RT

## 2021-01-21 PROCEDURE — 19301 PARTIAL MASTECTOMY: CPT | Mod: RT

## 2021-01-21 PROCEDURE — 76098 X-RAY EXAM SURGICAL SPECIMEN: CPT | Mod: 26

## 2021-01-21 PROCEDURE — 88304 TISSUE EXAM BY PATHOLOGIST: CPT | Mod: 26

## 2021-01-21 RX ORDER — ALIROCUMAB 75 MG/ML
0 INJECTION, SOLUTION SUBCUTANEOUS
Qty: 0 | Refills: 0 | DISCHARGE

## 2021-01-21 RX ORDER — ATORVASTATIN CALCIUM 80 MG/1
0 TABLET, FILM COATED ORAL
Qty: 0 | Refills: 0 | DISCHARGE

## 2021-01-21 RX ORDER — LANSOPRAZOLE 15 MG/1
1 CAPSULE, DELAYED RELEASE ORAL
Qty: 0 | Refills: 0 | DISCHARGE

## 2021-01-21 RX ORDER — ALPRAZOLAM 0.25 MG
1 TABLET ORAL
Qty: 0 | Refills: 0 | DISCHARGE

## 2021-01-21 RX ORDER — IPRATROPIUM BROMIDE 21 MCG
0 AEROSOL, SPRAY (ML) NASAL
Qty: 0 | Refills: 0 | DISCHARGE

## 2021-01-21 NOTE — ASU DISCHARGE PLAN (ADULT/PEDIATRIC) - DRESSING DRY FT
Chief Complaint   Patient presents with    Follow-up    Medication Refill     1. Have you been to the ER, urgent care clinic since your last visit? Hospitalized since your last visit? No    2. Have you seen or consulted any other health care providers outside of the [address] [place] [address] since your last visit? Include any pap smears or colon screening. No     HPI  Ramses Font comes in for follow-up care. Hypertension: Patient takes Benicar. Blood pressure has been well controlled. Today it is slightly elevated but previous readings have been stable. She would like to get Benicar the brand as that is only one that seems to work for her. I have written dispense as written to her prescription and a prescription was sent in.    Migraine with vertigo: Patient had migraine with medical.  She has been seen by neurology and put on medication for her migraine. Her migraine is better. She still has episodes of vertigo. Patient states that in [place] her provider did give her Valium to deal with the pedicle. She is also on hydrocodone. I did let her know that the she has been off the Valium for a while and the I  will give her something different to try for the pedicle. I will give her meclizine to take as needed for the vertigo. I will follow-up at the next visit. Mood disorder: Patient has previous history of depression. States that he no longer is feeling depressed. Wonders whether she should get off the venlafaxine. That would be her choice to make. It has been prescribed by the neurologist as it also helps with her headaches. She states her headaches are far and in between and for most part controlled. Muscle spasms: Patient has muscle spasms. She takes methocarbamol. Would like a refill of medication. She is also seen by her specialist and is on hydrocodone. She does have scoliosis this results in the back muscle pain and spasms.     Past Medical History  Past Medical History: Diagnosis Date    Headache     Hypertension     Scoliosis     Thyroid disease     Vertigo        Surgical History  Past Surgical History:   Procedure Laterality Date    HX BREAST AUGMENTATION  11/28/2006    HX CHOLECYSTECTOMY      HX COLONOSCOPY  08/2014    HX GASTRIC BYPASS  10/25/2001    HX HYSTERECTOMY  03/17/2003        Medications  Current Outpatient Prescriptions   Medication Sig Dispense Refill    BENICAR 20 mg tablet Take 1 Tab by mouth daily. 90 Tab 1    methocarbamol (ROBAXIN) 750 mg tablet TAKE 1 BY MOUTH 3 TIMES DAILY 270 Tab 1    meclizine (ANTIVERT) 25 mg tablet Take 1 Tab by mouth two (2) times daily as needed. Indications: Vertigo 180 Tab 0    topiramate (TOPAMAX) 25 mg tablet 2  q am 3 qhs 450 Tab 2    divalproex ER (DEPAKOTE ER) 500 mg ER tablet 2 at bedtime nightly 180 Tab 2    gabapentin (NEURONTIN) 100 mg capsule TAKE 2 BY MOUTH THREE TIMES DAILY 540 Cap 1    venlafaxine-SR (EFFEXOR-XR) 75 mg capsule 1 cap daily 90 Cap 1    cyanocobalamin, vitamin B-12, (VITAMIN B-12) 1,000 mcg/mL drop Take  by mouth.  magnesium oxide (MAG-OX) 400 mg tablet Take 400 mg by mouth daily.  thiamine (VITAMIN B-1) 100 mg tablet Take  by mouth daily.  diazePAM (VALIUM) 5 mg tablet Take 1 Tab by mouth daily as needed for Anxiety (vertigo). 30 Tab 0    levothyroxine (SYNTHROID) 50 mcg tablet Take  by mouth Daily (before breakfast).  cholecalciferol (VITAMIN D3) 1,000 unit cap Take  by mouth daily.  Omega-3-DHA-EPA-Fish Oil 1,000 mg (120 mg-180 mg) cap Take  by mouth.  MULTIVIT WITH CALCIUM,IRON,MIN (WOMEN'S DAILY MULTIVITAMIN PO) Take  by mouth.  Estradiol (EVAMIST) 1.53 mg/spray (1.7%) spry by TransDERmal route.  HYDROcodone-acetaminophen (LORTAB) 7.5-500 mg per tablet Take  by mouth every four (4) hours as needed for Pain.  azelastine-fluticasone (DYMISTA) 137-50 mcg/spray spry by Nasal route.          Allergies  Allergies   Allergen Reactions    Other Medication Anaphylaxis     ANTI INFLAMATORY    Sulfa (Sulfonamide Antibiotics) Rash       Family History  Family History   Problem Relation Age of Onset    Elevated Lipids Mother     Hypertension Brother        Social History  Social History     Social History    Marital status:      Spouse name: N/A    Number of children: N/A    Years of education: N/A     Occupational History    Not on file.      Social History Main Topics    Smoking status: Never Smoker    Smokeless tobacco: Never Used    Alcohol use 1.8 oz/week     3 Glasses of wine per week      Comment: daily wine 4 glasses    Drug use: No    Sexual activity: Yes     Partners: Male     Birth control/ protection: None     Other Topics Concern    Not on file     Social History Narrative       Review of Systems  Review of Systems - History obtained from chart review and the patient  General ROS: positive for  - fatigue and malaise  negative for - chills or fever  Psychological ROS: positive for - mood swings  Ophthalmic ROS: positive for - uses glasses  ENT ROS: negative  Allergy and Immunology ROS: negative  Hematological and Lymphatic ROS: negative  Endocrine ROS: negative  Respiratory ROS: no cough, shortness of breath, or wheezing  Cardiovascular ROS: no chest pain or dyspnea on exertion  Gastrointestinal ROS: no abdominal pain, change in bowel habits, or black or bloody stools  Genito-Urinary ROS: no dysuria, trouble voiding, or hematuria  Musculoskeletal ROS: positive for - Back muscle spasms  Neurological ROS: positive for - headaches    Vital Signs  Visit Vitals    BP (!) 137/92 (BP 1 Location: Left arm, BP Patient Position: Sitting)    Pulse 62    Temp 95.6 °F (35.3 °C) (Oral)    Resp 18    Ht 5' 8\" (1.727 m)    Wt 189 lb (85.7 kg)    SpO2 98%    BMI 28.74 kg/m2         Physical Exam  Physical Examination: General appearance - oriented to person, place, and time and acyanotic, in no respiratory distress  Mental status - alert, oriented to person, place, and time, affect appropriate to mood  Chest -  no tachypnea, retractions or cyanosis  Heart - normal rate and regular rhythm  Back exam - limited range of motion  Neurological - alert, oriented, normal speech  Musculoskeletal - osteoarthritic changes noted in both hands    Results  Results for orders placed or performed during the hospital encounter of 09/27/17   VALPROIC ACID   Result Value Ref Range    Valproic acid 40 (L) 50 - 720 ug/ml   METABOLIC PANEL, COMPREHENSIVE   Result Value Ref Range    Sodium 141 136 - 145 mmol/L    Potassium 3.7 3.5 - 5.5 mmol/L    Chloride 107 100 - 108 mmol/L    CO2 26 21 - 32 mmol/L    Anion gap 8 3.0 - 18 mmol/L    Glucose 93 74 - 99 mg/dL    BUN 18 7.0 - 18 MG/DL    Creatinine 0.71 0.6 - 1.3 MG/DL    BUN/Creatinine ratio 25 (H) 12 - 20      GFR est AA >60 >60 ml/min/1.73m2    GFR est non-AA >60 >60 ml/min/1.73m2    Calcium 9.2 8.5 - 10.1 MG/DL    Bilirubin, total 0.3 0.2 - 1.0 MG/DL    ALT (SGPT) 56 13 - 56 U/L    AST (SGOT) 48 (H) 15 - 37 U/L    Alk. phosphatase 96 45 - 117 U/L    Protein, total 7.1 6.4 - 8.2 g/dL    Albumin 4.0 3.4 - 5.0 g/dL    Globulin 3.1 2.0 - 4.0 g/dL    A-G Ratio 1.3 0.8 - 1.7     CBC WITH AUTOMATED DIFF   Result Value Ref Range    WBC 5.9 4.6 - 13.2 K/uL    RBC 4.07 (L) 4.20 - 5.30 M/uL    HGB 13.8 12.0 - 16.0 g/dL    HCT 42.2 35.0 - 45.0 %    .7 (H) 74.0 - 97.0 FL    MCH 33.9 24.0 - 34.0 PG    MCHC 32.7 31.0 - 37.0 g/dL    RDW 13.1 11.6 - 14.5 %    PLATELET 596 213 - 200 K/uL    MPV 10.5 9.2 - 11.8 FL    NEUTROPHILS 50 40 - 73 %    LYMPHOCYTES 36 21 - 52 %    MONOCYTES 10 3 - 10 %    EOSINOPHILS 3 0 - 5 %    BASOPHILS 1 0 - 2 %    ABS. NEUTROPHILS 3.0 1.8 - 8.0 K/UL    ABS. LYMPHOCYTES 2.1 0.9 - 3.6 K/UL    ABS. MONOCYTES 0.6 0.05 - 1.2 K/UL    ABS. EOSINOPHILS 0.2 0.0 - 0.4 K/UL    ABS.  BASOPHILS 0.0 0.0 - 0.06 K/UL    DF AUTOMATED     TSH 3RD GENERATION   Result Value Ref Range    TSH 1.83 0.36 - 3.74 uIU/mL   LIPID PANEL   Result Value Ref Range    LIPID PROFILE          Cholesterol, total 235 (H) <200 MG/DL    Triglyceride 174 (H) <150 MG/DL    HDL Cholesterol 81 (H) 40 - 60 MG/DL    LDL, calculated 119.2 (H) 0 - 100 MG/DL    VLDL, calculated 34.8 MG/DL    CHOL/HDL Ratio 2.9 0 - 5.0     BILIRUBIN, DIRECT   Result Value Ref Range    Bilirubin, direct 0.1 0.0 - 0.2 MG/DL       ASSESSMENT and PLAN    ICD-10-CM ICD-9-CM    1. Essential hypertension I10 401.9 BENICAR 20 mg tablet   2. Muscle spasm M62.838 728.85 methocarbamol (ROBAXIN) 750 mg tablet   3. Migraine with vertigo G43.109 346.00 meclizine (ANTIVERT) 25 mg tablet     reviewed diet, exercise and weight control  reviewed medications and side effects in detail    I have discussed the diagnosis with the patient and the intended plan of care as seen in the above orders. The patient has received an after-visit summary and questions were answered concerning future plans. I have discussed medication, side effects, and warnings with the patient in detail. The patient verbalized understanding and is in agreement with the plan of care. The patient will contact the office with any additional concerns.     Mis Jones MD 4 2

## 2021-01-21 NOTE — ASU PATIENT PROFILE, ADULT - PSH
History of lumpectomy of right breast  2020  S/P   x2 1993  S/p nephrectomy  donor, left  S/P tonsillectomy  childhood

## 2021-01-21 NOTE — ASU DISCHARGE PLAN (ADULT/PEDIATRIC) - PAIN MANAGEMENT
Prescriptions electronically submitted to pharmacy from [facility] may take tylenol for pain next dose @ 10:30 pm/Prescriptions electronically submitted to pharmacy from [facility]

## 2021-01-21 NOTE — ASU DISCHARGE PLAN (ADULT/PEDIATRIC) - CARE PROVIDER_API CALL
Niki Bruner)  Surgery  [facility] [facility], [address]  [place] [address]  Phone: ()-  Fax: ()-  Follow Up Time: 2 weeks   Niki Bruner)  Surgery  [address] [facility], [address]  [place] [address]  Phone: ()-  Fax: ()-  Follow Up Time: 2 weeks    Mani Méndez)  Plastic Surgery  [address] [place] [id number]  Phone: [phone]  Fax: [phone]  Follow Up Time: 1 week

## 2021-01-21 NOTE — ASU DISCHARGE PLAN (ADULT/PEDIATRIC) - PROVIDER TOKENS
PROVIDER:[TOKEN:[[id number]],FOLLOWUP:[2 weeks]] PROVIDER:[TOKEN:[[id number]],FOLLOWUP:[2 weeks]],PROVIDER:[TOKEN:[[id number]],FOLLOWUP:[1 week]]

## 2021-01-21 NOTE — BRIEF OPERATIVE NOTE - OPERATION/FINDINGS
right breast re-excision lumpectomy with THONY , confirmed with intraop radiology   plastics closure with local flap

## 2021-01-21 NOTE — ASU DISCHARGE PLAN (ADULT/PEDIATRIC) - ASU DC SPECIAL INSTRUCTIONSFT
Breast Biopsy Post-operative Instructions  1.	Supportive bra- Please continue to wear the supportive bra for 2 weeks.  You may remove the bra to shower.  The bra will provide support, decrease the amount of swelling at the biopsy site, and make your recovery more comfortable.    2.	Wound dressing- There is a dressing on the wound, which consists of 2 layers.  The outer layer is a clear plastic dressing (called Tegaderm) which is waterproof.  This should remain in place for 4 days post-operatively.  On the 4th post-operative day, you should remove the clear plastic Tegaderm dressing. All of the stitches are “internal” and will dissolve naturally.    3.	Showering/Bathing- You may shower over the dressing. Do not allow the dressing to become wet. Please wait at least 48 hours before showering.      4.	Activity level- You may resume most normal daily activity as tolerated, but avoid strenuous activities such as aerobics, jogging, exercising or heavy lifting for at least 1 week after surgery.  You may return to work in 1-2 days after surgery.  You may drive as long as you are not taking any prescription pain medication.    5.	Pain Medication- You may take the prescribed medication which has been sent to your pharmacy or you may take extra-strength Tylenol as needed.      6.	Follow-up Appointment- Please call the office to schedule your post-operative appointment which should be approximately 2 weeks after surgery. Please call the office for an appointment at [phone].     7.	Bruising/Bleeding/Swelling- It is normal for there to be some bruising and swelling at the breast biopsy site, and there may be some staining of blood on to the dressing.  Some discomfort at the surgical site is expected.  If your symptoms seem excessive, or if you have any question or concerns, please call the office.      Anesthesia Precautions:    For the next 12 hours do not:   •	drive a car,  •	drink alcohol, beer, or wine,   •	make important personal or business decisions    Diet:   •	Progress diet slowly unless otherwise indicated    Physician Notification  •	Any Prescription issues  •	Bleeding that does not stop  •	Persistent nausea and vomiting  •	Pain not relieved by medications  •	Fever greater than 101®F  •	Inability to tolerate liquids or foods  •	Unable to urinate after 8 hours    Discharge and Disposition  •	Discharge to home    Follow Up Care:  •	In the event that you develop a complication and you are unable to reach your own physician, you may contact:  [phone] or go to the nearest Emergency Room.   •	Please call your surgeon to schedule your follow up appointment Breast Biopsy Post-operative Instructions  1.	Supportive bra- Please continue to wear the supportive bra for 2 weeks.  You may remove the bra to shower.  The bra will provide support, decrease the amount of swelling at the biopsy site, and make your recovery more comfortable.    2.	Wound dressing- There is a dressing on the wound, which consists of outer tegaderm. You should keep this dressing on for 2 days after surgery.    3.	Showering/Bathing- You may shower over the dressing. Do not allow the dressing to become wet. Please wait at least 48 hours before showering, and shower with the water towards your back. Pat incision dry, do not scrub.       4.	Activity level- You may resume most normal daily activity as tolerated, but avoid strenuous activities such as aerobics, jogging, exercising or heavy lifting for at least 1 week after surgery.  You may return to work in 1-2 days after surgery.  You may drive as long as you are not taking any prescription pain medication.    5.	Pain Medication- You may take the prescribed medication which has been sent to your pharmacy or you may take extra-strength Tylenol as needed.      6.	Follow-up Appointment- Please call the office to schedule your post-operative appointment which should be approximately 1 week after your surgery with Dr. Méndez [phone]), and 2 weeks after surgery with Dr. Bruner. To schedule an appointment with Dr. Bruner, please call the office for an appointment at [phone].     7.	Bruising/Bleeding/Swelling- It is normal for there to be some bruising and swelling at the breast surgery site, and there may be some staining of blood on to the dressing.  Some discomfort at the surgical site is expected.  If your symptoms seem excessive, or if you have any question or concerns, please call the office.      Anesthesia Precautions:    For the next 12 hours do not:   •	drive a car,  •	drink alcohol, beer, or wine,   •	make important personal or business decisions    Diet:   •	Progress diet slowly unless otherwise indicated    Physician Notification  •	Any Prescription issues  •	Bleeding that does not stop  •	Persistent nausea and vomiting  •	Pain not relieved by medications  •	Fever greater than 101®F  •	Inability to tolerate liquids or foods  •	Unable to urinate after 8 hours    Discharge and Disposition  •	Discharge to home    Follow Up Care:  •	In the event that you develop a complication and you are unable to reach your own physician, you may contact:  [phone] or go to the nearest Emergency Room.   •	Please call your surgeon to schedule your follow up appointment

## 2021-01-29 LAB — SURGICAL PATHOLOGY STUDY: SIGNIFICANT CHANGE UP

## 2022-11-30 ENCOUNTER — OFFICE (OUTPATIENT)
Dept: URBAN - METROPOLITAN AREA CLINIC 109 | Facility: CLINIC | Age: 67
Setting detail: OPHTHALMOLOGY
End: 2022-11-30
Payer: COMMERCIAL

## 2022-11-30 DIAGNOSIS — T15.11XA: ICD-10-CM

## 2022-11-30 PROCEDURE — 92002 INTRM OPH EXAM NEW PATIENT: CPT | Performed by: OPHTHALMOLOGY

## 2022-11-30 ASSESSMENT — REFRACTION_CURRENTRX
OS_AXIS: 130
OD_SPHERE: -2.00
OS_SPHERE: -2.00
OD_OVR_VA: 20/
OD_AXIS: 15
OS_CYLINDER: -2.00
OD_CYLINDER: -1.00
OS_OVR_VA: 20/

## 2022-11-30 ASSESSMENT — VISUAL ACUITY
OD_BCVA: 20/25
OS_BCVA: 20/40

## 2022-11-30 ASSESSMENT — TONOMETRY
OS_IOP_MMHG: 16
OD_IOP_MMHG: 16

## 2022-11-30 ASSESSMENT — CONFRONTATIONAL VISUAL FIELD TEST (CVF)
OD_FINDINGS: FULL
OS_FINDINGS: FULL

## 2023-01-08 NOTE — H&P PST ADULT - BP NONINVASIVE SYSTOLIC (MM HG)
Mian 98 1941, 80 y o  male MRN: [id number]  Unit/Bed#: E5 -01 Encounter: [id number]  Primary Care Provider: Mesfin Freedman MD   Date and time admitted to hospital: 1/7/2023 10:17 PM    * Bacteremia due to Enterobacter species  Assessment & Plan  · Blood culture growing gram-negative rods  · BC ID: Enterobacter cloacae complex  Recommendation for Cefepime  Avoid 1st, 2nd, and 3rd generation cephalosporins  · Likely urinary source  Ucx Dec grew both E cloacae and E faecalis  · Given one dose of IV Zosyn and vancomycin prior to transfer  · Will treat with IV Cefepime 1g BID, renally dosed  · Repeat blood cultures in process  · Follow-up urine culture  · ID consult    Acute on chronic blood loss anemia  Assessment & Plan  · History of hematuria due to bladder cancer  · Hg 7 1 prior to transfer  Received 1unit PRBC  · Recheck Hg and monitor closely  · Per cardiology note 1/06:  Considering his extensive coronary artery disease recommend maintaining hemoglobin greater than 8 at all times  · Per oncology note on Jan 6: Anemia multifactorial given hematuria and malabsorption from gastric bypass surgery  Recommended pursuing iron panel  Reconsult in one week if patient is still in TCF and iron deficiency confirmed  Once confirmed can arrange for IV iron replacement in the infusion center if feasible which will most likely improve his anemia significantly  Is unlikely to respond to oral iron d/t bariatric sx  · Iron panel collected prior to transfer  · Hold ASA and duloxetine  · Hem/Onc consult    Gross hematuria  Assessment & Plan  · Patient has persistent hematuria due to bladder cancer  History of recurrent UTI on suppressive Bactrim  · CT A/P: Stable bladder wall thickening, compatible with known malignancy    Increased hyperdensity within the bladder lumen, compatible with new blood clot  · Recent admission at [facility] 12/12-12/22 for hematuria and obstructive uropathy in the setting of known bladder cancer s/p BCG and subsequent chemotherapy/radiation s/p bilateral chronic PCN tubes  Noted to have new right-sided hydronephrosis on CT s/p cystoscopy, clot evacuation w/ right ureteral stenting on 12/14 by Dr Garfield Freeman  · UA bloody, innumerable RBC/WBC, +leuks, no nitrites  · Continue flushes  · Monitor I&O  · Monitor Hg  · Follow up urine culture, collected prior to transfer  · Urology consult    Nephrostomy status [facility])  Assessment & Plan  · Chronic bilateral nephrostomy tubes  · Prior to transfer patient reported abdominal pain, CT was done and shows: Stable bilateral percutaneous nephrostomy tubes with interval placement of a right double-J nephroureteral stent  Stable mild right upper pole caliectasis, with resolution of previously seen lower pole caliectasis and hydroureter  Persistent blood   · products in the distal right ureter  No left hydronephrosis  · Continue flushes  · Monitor I&O    Elevated brain natriuretic peptide (BNP) level  Assessment & Plan  · BNP >2000  EF 60-65%, normal wall motion  · CT small stable left pleural effusion with drainage catheter in place  · Furosemide was placed on hold Jan 6 given hypotension  Will resume with hold parameters  · Monitor daily weight    Influenza A  Assessment & Plan  · Patient tested + for influenza A prior to discharge to [facility] on [address]  · Asymptomatic therefore not treated   · Negative flu on Jan 7    Stage 4 chronic kidney disease 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 tx BCG, recurrence with CIS in 2016 BCG again  BCG refractory disease with pelvic metastases now s/p chemoradiation 2021, on current immunotherapy  · Chronic B/L PCN tubes  · CT A/P Jan 7: Stable bladder wall thickening, compatible with known malignancy  Stable metastatic retroperitoneal lymphadenopathy  Stable epicardial metastasis  · Per oncology note on Jan 6: Chemotherapy treatment should remain on hold for now untill he is clinically stable/discharged; particularly since he is positive flu  To follow-up with primary oncologist after discharge for reevaluation prior to resume treatment  · Consider palliative care consult to discuss goals of care    Coronary artery disease of native artery of native heart with stable angina pectoris [facility])  Assessment & Plan  · Outpatient regimen ASA, Imdur, metoprolol, zetia    Sinus tachycardia  Assessment & Plan  · Seen by cardiology at [facility] on Jan 6 for sinus tachycardia  · On Jan 2, patient had a brief period of unresponsiveness and noted with hypotension  Blood pressure improved after holding medications: furosemide, Imdur, metoprolol and tamsulosin  · Patient noted to be tachycardic  ECG on Jan 6: Sinus tachycardia 118, occasional PVC   · Cardiology recommended gradually increasing metoprolol to 37 5mg twice daily and subsequently to 50 mg twice daily if his blood pressure would allow  · Tachycardia multifactorial given fever, blood loss anemia and hypotension  · Will resume metoprolol 25mg BID for now with hold parameters  · EKG today showing sinus rhythm rate 78    Symptomatic hypotension  Assessment & Plan  · Patient had episode of unresponsiveness with hypotension on Jan 2  He was sent from [address] [place] [address] to ER  · Noted with anemia  Received IV bolus in ED and 2u PRBC  Improved and medically cleared for return to [facility]   · Close BP monitoring    Chronic pleural effusion  Assessment & Plan  · Chronic pleural effusion  CT showing Stable small left pleural effusion with drainage catheter in place    Stable epicardial metastasis    Continuous opioid dependence (HCC)  Assessment & Plan  · Maintained on oxycodone 10mg TID prn, verified on PDMP    Type 2 diabetes mellitus, with long-term current use of insulin (HCC)  Assessment & Plan  Lab Results   Component Value Date    HGBA1C 7 2 (H) 10/20/2022     · Continue Lantus qhs  Add sliding scale insulin (listed humalog allergy although patient has been receiving Humalog throughout hospital stay)  · ADA diet    No results for input(s): POCGLU in the last 72 hours  Blood Sugar Average: Last 72 hrs:      VTE Prophylaxis: Hematuria  / sequential compression device   Code Status: FC  POLST: POLST is not applicable to this patient  Discussion with family: Spouse at bedside    Anticipated Length of Stay:  Patient will be admitted on an Inpatient basis with an anticipated length of stay of  > 2 midnights  Justification for Hospital Stay: Enterococcus bacteremia secondary to UTI, hematuria, blood loss anemia, advanced bladder cancer    Total Time for Visit, including Counseling / Coordination of Care: 60 minutes  Greater than 50% of this total time spent on direct patient counseling and coordination of care  Chief Complaint:   hematuria    History of Present Illness:    Lisset Stapleton is a 80 y o  male who was transferred from [facility] as above  Patient was admitted at [facility] in Dec due to UTI and hematuria  Transferred to [facility] rehab Dec 22  On January 1 rapid response was called due to brief episode of unresponsiveness, noted with hypotension and anemia  Symptoms improved with bolus, 2 units PRBC and holding Imdur, metoprolol, furosemide and tamsulosin  He was readmitted at [facility] then sent back to [facility] on Jan 4  On Jan 6 patient had a fever, blood cultures were collected and showed Enterobacter  Recommended transfer to [facility] for treatment of bacteremia, urology and ID consult      Patient reports hematuria from right nephrostomy and cloudy yellow urine output from left, also confirmed hematuria through penis  Reports suprapubic pain and chronic intermittent bladder spasms  ROS positive for stool incontinence- diarrhea last night, decreased appetite, chronic shortness and chronic pleuritic pain  Denies increased dyspnea from baseline, cough or sputum  Review of Systems:    Review of Systems   Constitutional: Positive for appetite change  Poor appetite   HENT: Negative  Respiratory: Positive for shortness of breath  Negative for cough, chest tightness and wheezing  Chronic shortness of breath   Cardiovascular: Negative  Gastrointestinal: Positive for diarrhea  Negative for abdominal pain, anal bleeding, constipation, nausea and vomiting  Stool incontinence  Diarrhea x6 yesterday   Genitourinary: Positive for hematuria  Suprapubic pain, intermittent bladder spasms, normal urine output draining from nephrostomy   Musculoskeletal: Positive for back pain  Mild back pain   Skin: Negative  Neurological: Negative  Psychiatric/Behavioral: Negative          Past Medical and Surgical History:     Past Medical History:   Diagnosis Date   • Anemia     Last assessed: 9/28/17   • Anxiety    • Arteriosclerotic cardiovascular disease     Last assessed: 9/28/17   • Arthritis    • Bladder cancer ([place] [address] )     bladder- had BCG treatments   • Chronic kidney disease     Stage IV   • CKD (chronic kidney disease) stage 4, GFR 15-29 ml/min ([facility])    • Colon polyp    • Coronary artery disease     7 stents   • Depression    • Diabetes mellitus ([facility])     IDDM   • GERD (gastroesophageal reflux disease)    • Glaucoma    • Hematuria    • History of fusion of cervical spine    • Hyperlipidemia    • Hypertension    • Insomnia     Last assessed: 11/14/12   • Loss of hearing     has hearing aids but usually does not wear them   • Lung cancer [facility])    • Metastatic cancer ([place] [address] )    • Other seasonal allergic rhinitis     Last assessed: 2/10/16   • PAD (peripheral artery disease) ([place] [address] ) • Shortness of breath     on exertion   • Spinal stenosis of lumbar region    • Transient cerebral ischemia     No Residual   • Uses walker     w/c for longer distances       Past Surgical History:   Procedure Laterality Date   • CARDIAC SURGERY      Cath stent placement  Last assessed: 3/9/17  Interventional Catheterization   • CHOLECYSTECTOMY     • COLONOSCOPY     • CYSTOSCOPY      Diagnostic w/biopsy  Elvan Civil  Last assessed: 12/1/14   • CYSTOSCOPY N/A 04/12/2022    Procedure: CYSTOSCOPY  Bladder biopsies  ;  Surgeon: Harry Breen MD;  [facility];  Service: Urology   • CYSTOSCOPY W/ RETROGRADES Right 03/01/2022    Procedure: CYSTO; stent removal retrograde;  Surgeon: Harry Breen MD;  [facility];  Service: Urology   • CYSTOSCOPY W/ URETERAL STENT PLACEMENT Bilateral 10/18/2021    Procedure: bilateral retrogrades, cytology collection;  Surgeon: Harry Breen MD;  [facility];  Service: Urology   • CYSTOURETHROSCOPY      w/cautery  Elvan Civil   • FL RETROGRADE PYELOGRAM  10/18/2021   • FL RETROGRADE PYELOGRAM  10/24/2021   • FL RETROGRADE PYELOGRAM  12/14/2022   • GASTRIC BYPASS      For morbid obesity w/Shaji-en-Y   Resolved: 11/17/09   • INCISION AND DRAINAGE OF WOUND Right 02/26/2017    Procedure: INCISION AND DRAINAGE (I&D) EXTREMITY WITH APPLICATION OF GRAFT JACKET;  Surgeon: Chet Gillis DPM;  [facility];  Service:    • INCISION AND DRAINAGE OF WOUND Right 04/25/2017    Procedure: INCISION AND DRAINAGE (I&D) EXTREMITY, APPLICATION OF GRAFT;  Surgeon: Chet Gillis DPM;  [facility];  Service:    • IR BIOPSY OTHER  07/02/2020   • IR LOWER EXTREMITY ANGIOGRAM  02/08/2021   • IR LOWER EXTREMITY ANGIOGRAM  02/11/2021   • IR NEPHROSTOMY TUBE CHECK/CHANGE/REPOSITION/REINSERTION/UPSIZE  04/28/2022   • IR NEPHROSTOMY TUBE CHECK/CHANGE/REPOSITION/REINSERTION/UPSIZE  05/24/2022   • IR NEPHROSTOMY TUBE CHECK/CHANGE/REPOSITION/REINSERTION/UPSIZE  06/07/2022   • IR NEPHROSTOMY TUBE CHECK/CHANGE/REPOSITION/REINSERTION/UPSIZE  07/28/2022   • IR NEPHROSTOMY TUBE CHECK/CHANGE/REPOSITION/REINSERTION/UPSIZE  11/15/2022   • IR NEPHROSTOMY TUBE PLACEMENT  02/25/2022   • IR PORT PLACEMENT  01/17/2022   • IR THORACENTESIS  09/02/2022   • IR THORACENTESIS  09/14/2022   • IR THORACENTESIS WITH TUBE PLACEMENT Left     october   • IR TUNNELED CENTRAL LINE PLACEMENT  12/24/2020   • JOINT REPLACEMENT      christofer knees replaced   • DC AMPUTATION METATARSAL W/TOE SINGLE Left 12/21/2020    Procedure: RAY RESECTION FOOT;  Surgeon: Disha Almanza DPM;  [facility];  Service: Podiatry   • DC AMPUTATION METATARSAL W/TOE SINGLE Left 12/31/2020    Procedure: 5TH MET RESECTION;  Surgeon: Disha Almanza DPM;  [facility];  Service: Podiatry   • DC CYSTO BLADDER W/URETERAL CATHETERIZATION Right 12/14/2022    Procedure: CYSTOSCOPY WITH RETROGRADE PYELOGRAM and CLOT EVACUATION, RIGHT STENT INSERTION, FULGRATION OF BLEEDING POINTS;  Surgeon: Jose Mendez MD;  [facility];  Service: Urology   • DC CYSTO W/IRRIG & EVAC MULTPLE OBSTRUCTING CLOTS N/A 02/10/2021    Procedure: CYSTOSCOPY EVACUATION OF CLOTS, fulguration;  Surgeon: Ti Jackson MD;  [facility];  Service: Urology   • DC CYSTO W/IRRIG & EVAC MULTPLE OBSTRUCTING CLOTS N/A 10/24/2021    Procedure: CYSTOSCOPY EVACUATION OF CLOT, fulguration of bleeding vessels, right ureter stent placement, retrograde pyelogram;  Surgeon: Elliott Urrutia MD;  [facility];  Service: Urology   • DC CYSTO W/REMOVAL OF LESIONS SMALL N/A 11/19/2020    Procedure: CYSTO W/BIOPSIES, transurethral prostate bx;  Surgeon: Mariela Villegas MD;  [facility];  Service: Urology   • DC CYSTOURETHROSCOPY W/DEST &/RMVL TUMOR LARGE Bilateral 10/18/2021    Procedure: TRANSURETHRAL RESECTION OF BLADDER TUMOR (TURBT);   Surgeon: Jazmin Vazuqez MD;  [facility];  Service: Urology   • CO CYSTOURETHROSCOPY WITH BIOPSY N/A 08/16/2016    Procedure: CYSTOSCOPY WITH BIOPSIES;  Surgeon: Brandon Woods MD;  [facility];  Service: Urology   • CO Johnny Andrea 3RD+ ORD [address] PEL/LXTR [facility] Left 02/08/2021    Procedure: LEG angiogram, CO2 w/limited contrast with balloon angioplasty postertior tibial artery;  Surgeon: Manasa Gonzales MD;  [facility];  Service: Vascular   • ROTATOR CUFF REPAIR     • SMALL INTESTINE SURGERY      Surgery Shaji-en-Y   • SPINAL FUSION      lumbar and cervical fusions   • VAC DRESSING APPLICATION Right 79/51/2722    Procedure: APPLICATION VAC DRESSING;  Surgeon: Salo Macedo DPM;  [facility];  Service:    • WOUND DEBRIDEMENT Left 02/16/2021    Procedure: FOOT DEBRIDE, [address] OUT w/graft application;  Surgeon: Salo Macedo DPM;  [facility];  Service: Podiatry       Meds/Allergies:    Prior to Admission medications    Medication Sig Start Date End Date Taking?  Authorizing Provider   acetaminophen (TYLENOL) 325 mg tablet Take 650 mg by mouth every 6 (six) hours as needed for mild pain     Yes Historical Provider, MD   aspirin (ECOTRIN LOW STRENGTH) 81 mg EC tablet Take 1 tablet (81 mg total) by mouth daily 11/1/21  Yes Amisha Castro MD   Bimatoprost (LUMIGAN OP) Apply 1 drop to eye daily at bedtime    Yes Historical Provider, MD   DULoxetine (CYMBALTA) 30 mg delayed release capsule TAKE ONE CAPSULE BY MOUTH EVERY DAY 1/6/23  Yes Amisha Castro MD   fluticasone [facility]) 50 mcg/act nasal spray 1 spray into each nostril as needed for allergies 11/19/22  Yes Ralph Bay PA-C   furosemide (LASIX) 20 mg tablet Take 1 tablet (20 mg total) by mouth daily 10/31/22  Yes Amisha Castro MD   gabapentin (NEURONTIN) 300 mg capsule TAKE ONE CAPSULE BY MOUTH EVERY DAY AT BEDTIME 1/6/23  Yes Amisha Castro MD   insulin lispro (HumaLOG) 100 units/mL injection Inject 5 Units under the skin 3 (three) times a day with meals 12/22/22  Yes Lidia Gao MD   multivitamin [facility]) TABS Take 1 tablet by mouth daily  Yes Historical Provider, MD   omeprazole (PriLOSEC) 20 mg delayed release capsule Take 1 capsule (20 mg total) by mouth daily in the early morning PRN 10/4/22  Yes Estuardo Prior, MD   Accu-Chek Softclix Lancets lancets Test blood sugar 4 times daily 2/23/22   Estuarod Prior, MD   ARIPiprazole (ABILIFY) 2 mg tablet Take 2 mg by mouth daily    Historical Provider, MD   Azelastine HCl 0 15 % SOLN Inhale 1 spray 2 (two) times a day 5/14/20   Estuardo Prior, MD   calcitriol (ROCALTROL) 0 25 mcg capsule Take 1 capsule (0 25 mcg total) by mouth daily 2/2/22   Estuardo Prior, MD   cyanocobalamin (VITAMIN B-12) 1000 MCG tablet Take 1 tablet (1,000 mcg total) by mouth daily 11/30/22 2/28/23  Estuardo Prior, MD   ezetimibe (ZETIA) 10 mg tablet Take 1 tablet (10 mg total) by mouth daily 10/31/22   Estuardo Prior, MD   Ferrous Sulfate Dried (Feosol) 200 (65 Fe) MG TABS Take 65 mg by mouth daily 2/2/22   Estuardo Prior, MD   Insulin Pen Needle 31G X 8 MM MISC Inject 3 times a day 5/8/19   Estuardo Prior, MD James Sabillon 100 units/mL injection pen 18 units qhs  Patient taking differently: Inject 18 Units under the skin daily at bedtime 6/24/21   Lauren Pugh PA-C   loperamide (IMODIUM) 2 mg capsule Take 2 mg by mouth 4 (four) times a day as needed for diarrhea    Historical Provider, MD   polyethylene glycol (MIRALAX) 17 g packet Take 17 g by mouth daily 9/26/22 April EVANGELINA Miller   senna (SENOKOT) 8 6 MG tablet Take 2 tablets (17 2 mg total) by mouth 2 (two) times a day  Patient not taking: Reported on 1/7/2023 9/26/22 April EVANGELINA Miller   betamethasone valerate (VALISONE) 0 1 % cream Apply topically 2 (two) times a day 4/28/21 5/18/21  Estuardo Rowe MD     I have reviewed home medications using allscripts  Allergies:    Allergies   Allergen Reactions   • Atorvastatin Hives, Itching and Rash   • Simvastatin Rash and Edema     Edema of lower legs   • Statins Hives and Itching   • Insulin Lispro Swelling and Edema     " Lower Legs"   • Other Itching, Rash and Other (See Comments)     "EKG Patches"   "blue EKG patches"       Social History:     Marital Status: /Civil Union   Occupation: retired  Patient Pre-hospital Living Situation: Resides at home with spouse  Patient Pre-hospital Level of Mobility: Walker  Patient Pre-hospital Diet Restrictions:   Substance Use History:   Social History     Substance and Sexual Activity   Alcohol Use Never    Comment: beer / liquor     Social History     Tobacco Use   Smoking Status Former   • Packs/day: 3 00   • Years: 27 00   • Pack years: 81 00   • Types: Cigarettes   • Quit date: 5   • Years since quittin 0   Smokeless Tobacco Never     Social History     Substance and Sexual Activity   Drug Use Not Currently   • Types: Marijuana    Comment: quit 2019 had medical marijuana       Family History:    Family History   Problem Relation Age of Onset   • Diabetes Mother    • Heart disease Mother    • Other Mother         High blood pressure   • Heart disease Father    • Diabetes Sister    • Other Sister         High blood pressure   • Kidney disease Sister    • Heart disease Brother    • Other Brother         High blood pressure       Physical Exam:     Vitals:   Blood Pressure: 126/71 (23)  Pulse: 80 (23)  Temperature: 97 6 °F (36 4 °C) (23)  Respirations: 16 (23)  SpO2: 95 % (230)    Physical Exam  Constitutional:       General: He is not in acute distress  Appearance: Normal appearance  He is ill-appearing  He is not toxic-appearing or diaphoretic  Comments: Underweight; chronically ill appearing   HENT:      Head: Normocephalic and atraumatic  Nose: No congestion or rhinorrhea  Mouth/Throat:      Mouth: Mucous membranes are dry     Eyes:      General: No scleral icterus  Conjunctiva/sclera: Conjunctivae normal    Cardiovascular:      Rate and Rhythm: Normal rate and regular rhythm  Heart sounds: Normal heart sounds  Pulmonary:      Effort: Pulmonary effort is normal       Breath sounds: Normal breath sounds  Abdominal:      General: Bowel sounds are normal  There is no distension  Palpations: Abdomen is soft  Tenderness: There is no abdominal tenderness  There is no guarding  Genitourinary:     Comments: Right nephrostomy gross hematuria  Left nephrostomy cloudy yellow urine output  Moderate amount of hematuria on incontinent pad under patient- he confirms voiding hematuria  Musculoskeletal:      Right lower leg: Edema present  Left lower leg: Edema present  Comments: Trace edema   Skin:     General: Skin is dry  Coloration: Skin is pale  Comments: Right Port-A-Cath  Dry wounds scabbed over B/LLE lower extremities and feet   Neurological:      Mental Status: He is alert and oriented to person, place, and time  Psychiatric:         Mood and Affect: Mood normal          Behavior: Behavior normal          Thought Content: Thought content normal          Judgment: Judgment normal        Additional Data:     Lab Results: I have personally reviewed pertinent reports        Results from last 7 days   Lab Units 01/07/23  1907   WBC Thousand/uL 9 78   HEMOGLOBIN g/dL 7 1*   HEMATOCRIT % 22 3*   PLATELETS Thousands/uL 168   NEUTROS PCT % 59   LYMPHS PCT % 27   MONOS PCT % 6   EOS PCT % 7*     Results from last 7 days   Lab Units 01/07/23  1613   SODIUM mmol/L 135   POTASSIUM mmol/L 5 5*   CHLORIDE mmol/L 101   CO2 mmol/L 25   BUN mg/dL 57*   CREATININE mg/dL 3 21*   ANION GAP mmol/L 9   CALCIUM mg/dL 8 9   ALBUMIN g/dL 3 4*   TOTAL BILIRUBIN mg/dL 0 63   ALK PHOS U/L 96   ALT U/L 24   AST U/L 34   GLUCOSE RANDOM mg/dL 207*     Results from last 7 days   Lab Units 01/07/23  1613   INR  0 90     Results from last 7 days   Lab Units 01/07/23  2357 01/04/23  1131 01/04/23  0637 01/04/23  0554 01/03/23  2143 01/03/23  1614 01/03/23  1112 01/03/23  0539 01/02/23 2012 01/02/23  1725   POC GLUCOSE mg/dl 174* 100 102 67 257* 108 207* 203* 155* 75         Results from last 7 days   Lab Units 01/07/23  1613 01/07/23  1114 01/06/23  0704 01/02/23  1340   LACTIC ACID mmol/L 1 4  --   --  1 3   PROCALCITONIN ng/ml  --  0 65* 0 47*  --        Imaging: I have personally reviewed pertinent reports  No orders to display       EKG, Pathology, and Other Studies Reviewed on Admission:   · EKG echo ct cxr    Allscripts / Epic Records Reviewed: Yes     ** Please Note: This note has been constructed using a voice recognition system   ** 102

## 2023-10-10 PROBLEM — C50.919 MALIGNANT NEOPLASM OF UNSPECIFIED SITE OF UNSPECIFIED FEMALE BREAST: Chronic | Status: ACTIVE | Noted: 2020-11-03

## 2023-10-19 ENCOUNTER — NON-APPOINTMENT (OUTPATIENT)
Age: 68
End: 2023-10-19

## 2023-10-19 DIAGNOSIS — J30.9 ALLERGIC RHINITIS, UNSPECIFIED: ICD-10-CM

## 2023-11-01 RX ORDER — PANTOPRAZOLE 40 MG/1
40 TABLET, DELAYED RELEASE ORAL
Refills: 0 | Status: ACTIVE | COMMUNITY
Start: 2023-11-01

## 2023-11-01 RX ORDER — IPRATROPIUM BROMIDE 42 UG/1
0.06 SPRAY NASAL
Refills: 0 | Status: ACTIVE | COMMUNITY
Start: 2023-11-01

## 2023-11-01 RX ORDER — ALIROCUMAB 150 MG/ML
150 INJECTION, SOLUTION SUBCUTANEOUS
Refills: 0 | Status: ACTIVE | COMMUNITY
Start: 2023-11-01

## 2023-11-01 RX ORDER — RIZATRIPTAN BENZOATE 10 MG/1
10 TABLET ORAL
Refills: 0 | Status: ACTIVE | COMMUNITY
Start: 2023-11-01

## 2023-11-01 RX ORDER — ALPRAZOLAM 0.25 MG/1
0.25 TABLET ORAL
Refills: 0 | Status: ACTIVE | COMMUNITY
Start: 2023-11-01

## 2023-11-20 ENCOUNTER — NON-APPOINTMENT (OUTPATIENT)
Age: 68
End: 2023-11-20

## 2023-11-20 ENCOUNTER — APPOINTMENT (OUTPATIENT)
Dept: PULMONOLOGY | Facility: CLINIC | Age: 68
End: 2023-11-20
Payer: COMMERCIAL

## 2023-11-20 VITALS
OXYGEN SATURATION: 96 % | HEART RATE: 76 BPM | SYSTOLIC BLOOD PRESSURE: 108 MMHG | WEIGHT: 160 LBS | BODY MASS INDEX: 30.21 KG/M2 | HEIGHT: 61 IN | DIASTOLIC BLOOD PRESSURE: 70 MMHG

## 2023-11-20 DIAGNOSIS — Z84.1 FAMILY HISTORY OF DISORDERS OF KIDNEY AND URETER: ICD-10-CM

## 2023-11-20 DIAGNOSIS — Z77.098 CONTACT WITH AND (SUSPECTED) EXPOSURE TO OTHER HAZARDOUS, CHIEFLY NONMEDICINAL, CHEMICALS: ICD-10-CM

## 2023-11-20 DIAGNOSIS — K21.9 GASTRO-ESOPHAGEAL REFLUX DISEASE W/OUT ESOPHAGITIS: ICD-10-CM

## 2023-11-20 DIAGNOSIS — C50.919 MALIGNANT NEOPLASM OF UNSPECIFIED SITE OF UNSPECIFIED FEMALE BREAST: ICD-10-CM

## 2023-11-20 DIAGNOSIS — E78.5 HYPERLIPIDEMIA, UNSPECIFIED: ICD-10-CM

## 2023-11-20 DIAGNOSIS — Z78.9 OTHER SPECIFIED HEALTH STATUS: ICD-10-CM

## 2023-11-20 DIAGNOSIS — Z01.812 ENCOUNTER FOR PREPROCEDURAL LABORATORY EXAMINATION: ICD-10-CM

## 2023-11-20 DIAGNOSIS — F32.A ANXIETY DISORDER, UNSPECIFIED: ICD-10-CM

## 2023-11-20 DIAGNOSIS — Z00.00 ENCOUNTER FOR GENERAL ADULT MEDICAL EXAMINATION W/OUT ABNORMAL FINDINGS: ICD-10-CM

## 2023-11-20 DIAGNOSIS — F41.9 ANXIETY DISORDER, UNSPECIFIED: ICD-10-CM

## 2023-11-20 DIAGNOSIS — G43.909 MIGRAINE, UNSPECIFIED, NOT INTRACTABLE, W/OUT STATUS MIGRAINOSUS: ICD-10-CM

## 2023-11-20 DIAGNOSIS — Z87.891 PERSONAL HISTORY OF NICOTINE DEPENDENCE: ICD-10-CM

## 2023-11-20 DIAGNOSIS — R69 ILLNESS, UNSPECIFIED: ICD-10-CM

## 2023-11-20 LAB
ALBUMIN SERPL ELPH-MCNC: 4.5 G/DL
ALP BLD-CCNC: 88 U/L
ALT SERPL-CCNC: 22 U/L
ANION GAP SERPL CALC-SCNC: 12 MMOL/L
AST SERPL-CCNC: 24 U/L
BASOPHILS # BLD AUTO: 0.04 K/UL
BASOPHILS NFR BLD AUTO: 0.8 %
BILIRUB SERPL-MCNC: 0.6 MG/DL
BILIRUB UR QL STRIP: NORMAL
BUN SERPL-MCNC: 22 MG/DL
CALCIUM SERPL-MCNC: 10.1 MG/DL
CHLORIDE SERPL-SCNC: 103 MMOL/L
CHOLEST SERPL-MCNC: 408 MG/DL
CLARITY UR: CLEAR
CO2 SERPL-SCNC: 25 MMOL/L
COLLECTION METHOD: NORMAL
CREAT SERPL-MCNC: 1.08 MG/DL
EGFR: 57 ML/MIN/1.73M2
EOSINOPHIL # BLD AUTO: 0.07 K/UL
EOSINOPHIL NFR BLD AUTO: 1.4 %
GLUCOSE SERPL-MCNC: 96 MG/DL
GLUCOSE UR-MCNC: NORMAL
HCG UR QL: 0.2 EU/DL
HCT VFR BLD CALC: 40.4 %
HDLC SERPL-MCNC: 83 MG/DL
HGB BLD-MCNC: 13.6 G/DL
HGB UR QL STRIP.AUTO: NORMAL
IMM GRANULOCYTES NFR BLD AUTO: 0.2 %
KETONES UR-MCNC: NORMAL
LDLC SERPL CALC-MCNC: 290 MG/DL
LEUKOCYTE ESTERASE UR QL STRIP: NORMAL
LYMPHOCYTES # BLD AUTO: 1.64 K/UL
LYMPHOCYTES NFR BLD AUTO: 32.7 %
MAN DIFF?: NORMAL
MCHC RBC-ENTMCNC: 31.9 PG
MCHC RBC-ENTMCNC: 33.7 GM/DL
MCV RBC AUTO: 94.8 FL
MONOCYTES # BLD AUTO: 0.49 K/UL
MONOCYTES NFR BLD AUTO: 9.8 %
NEUTROPHILS # BLD AUTO: 2.76 K/UL
NEUTROPHILS NFR BLD AUTO: 55.1 %
NITRITE UR QL STRIP: NORMAL
NONHDLC SERPL-MCNC: 325 MG/DL
PH UR STRIP: 6
PLATELET # BLD AUTO: 162 K/UL
POCT - HEMOGLOBIN (HGB), QUANTITATIVE, TRANSCUTANEOUS: 13.5
POTASSIUM SERPL-SCNC: 4.7 MMOL/L
PROT SERPL-MCNC: 7.2 G/DL
PROT UR STRIP-MCNC: NORMAL
RBC # BLD: 4.26 M/UL
RBC # FLD: 13.1 %
SODIUM SERPL-SCNC: 141 MMOL/L
SP GR UR STRIP: 1.02
TRIGL SERPL-MCNC: 179 MG/DL
WBC # FLD AUTO: 5.01 K/UL

## 2023-11-20 PROCEDURE — 94729 DIFFUSING CAPACITY: CPT

## 2023-11-20 PROCEDURE — 81003 URINALYSIS AUTO W/O SCOPE: CPT | Mod: QW

## 2023-11-20 PROCEDURE — 93000 ELECTROCARDIOGRAM COMPLETE: CPT

## 2023-11-20 PROCEDURE — 99205 OFFICE O/P NEW HI 60 MIN: CPT | Mod: 25

## 2023-11-20 PROCEDURE — 94727 GAS DIL/WSHOT DETER LNG VOL: CPT

## 2023-11-20 PROCEDURE — ZZZZZ: CPT

## 2023-11-20 PROCEDURE — 88738 HGB QUANT TRANSCUTANEOUS: CPT

## 2023-11-20 PROCEDURE — 94060 EVALUATION OF WHEEZING: CPT

## 2023-11-20 PROCEDURE — 36415 COLL VENOUS BLD VENIPUNCTURE: CPT

## 2024-06-18 NOTE — ED PROVIDER NOTE - CROS ED SKIN ALL NEG
No Answer, Message left letting patient know surgery can be scheduled for 06/27/2024 at 12:30pm and let us know if she can schedule medical clearance with PCP prior to the surgical date.   - - -

## 2024-11-11 NOTE — ASU PATIENT PROFILE, ADULT - VISION (WITH CORRECTIVE LENSES IF THE PATIENT USUALLY WEARS THEM):
Continue Soliqua 60 units daily (this is max dose)     Increase Glimepiride to 8 mg daily, by taking two, 4 mg strength tabs.     I think a monitor to watch sugars can be helpful, with insulin you would meet coverage, but unclear how much this would be?     If every wanting to do a sample monitor, our educators here at this location can do them.     Try to check sugars 1 hour after a meal to see how high your sugars go.     Stop Pioglitazone due to water retention and lack of efficacy (?).     Try to reduce sodas, the high fructose corn syrup is very hard on the liver and can risk \"fatty liver disease\". Few times for special occasions, are OK.     Activity as tolerated    Carbs to 25% of the plate with meals  Protein with all meals.     4 months follow up     Up to date on foot, eye, and urine tests.    Partially impaired: cannot see medication labels or newsprint, but can see obstacles in path, and the surrounding layout; can count fingers at arm's length

## 2024-11-13 ENCOUNTER — OFFICE (OUTPATIENT)
Dept: URBAN - METROPOLITAN AREA CLINIC 109 | Facility: CLINIC | Age: 69
Setting detail: OPHTHALMOLOGY
End: 2024-11-13
Payer: MEDICARE

## 2024-11-13 DIAGNOSIS — H43.813: ICD-10-CM

## 2024-11-13 DIAGNOSIS — H25.13: ICD-10-CM

## 2024-11-13 PROCEDURE — 99204 OFFICE O/P NEW MOD 45 MIN: CPT | Performed by: OPHTHALMOLOGY

## 2024-11-13 PROCEDURE — 92201 OPSCPY EXTND RTA DRAW UNI/BI: CPT | Performed by: OPHTHALMOLOGY

## 2024-11-13 ASSESSMENT — REFRACTION_CURRENTRX
OD_VPRISM_DIRECTION: SV
OS_CYLINDER: -1.00
OS_AXIS: 126
OS_SPHERE: -2.00
OS_OVR_VA: 20/
OS_VPRISM_DIRECTION: SV
OD_SPHERE: -1.50
OD_AXIS: 37
OD_OVR_VA: 20/
OD_CYLINDER: -1.00

## 2024-11-13 ASSESSMENT — TONOMETRY
OD_IOP_MMHG: 16
OS_IOP_MMHG: 19

## 2024-11-13 ASSESSMENT — REFRACTION_AUTOREFRACTION
OS_AXIS: 141
OS_SPHERE: -2.00
OD_CYLINDER: -1.50
OS_CYLINDER: -1.00
OD_SPHERE: -2.25
OD_AXIS: 114

## 2024-11-13 ASSESSMENT — VISUAL ACUITY
OD_BCVA: 20/30-1
OS_BCVA: 20/40-2

## 2024-11-13 ASSESSMENT — KERATOMETRY
OD_K2POWER_DIOPTERS: 45.00
OD_AXISANGLE_DEGREES: 143
OS_K2POWER_DIOPTERS: 45.25
OS_K1POWER_DIOPTERS: 44.50
OS_AXISANGLE_DEGREES: 077
OD_K1POWER_DIOPTERS: 44.75

## 2024-11-13 ASSESSMENT — CONFRONTATIONAL VISUAL FIELD TEST (CVF)
OS_FINDINGS: FULL
OD_FINDINGS: FULL

## 2024-11-18 ENCOUNTER — RESULT CHARGE (OUTPATIENT)
Age: 69
End: 2024-11-18

## 2024-11-18 ENCOUNTER — APPOINTMENT (OUTPATIENT)
Dept: PULMONOLOGY | Facility: CLINIC | Age: 69
End: 2024-11-18

## 2024-11-18 DIAGNOSIS — Z77.098 CONTACT WITH AND (SUSPECTED) EXPOSURE TO OTHER HAZARDOUS, CHIEFLY NONMEDICINAL, CHEMICALS: ICD-10-CM

## 2024-12-16 NOTE — ED ADULT TRIAGE NOTE - WEIGHT IN LBS
Pre-op Diagnosis: Triceps tendon rupture, right, initial encounter [S46.311A]    The above referenced H&P was reviewed by Fam Dorman MD on 12/16/2024, the patient was examined and no significant changes have occurred in the patient's condition since the H&P was performed.  I discussed with the patient and/or legal representative the potential benefits, risks and side effects of this procedure; the likelihood of the patient achieving goals; and potential problems that might occur during recuperation.  I discussed reasonable alternatives to the procedure, including risks, benefits and side effects related to the alternatives and risks related to not receiving this procedure.  We will proceed with procedure as planned.       169.9